# Patient Record
Sex: FEMALE | Race: ASIAN | NOT HISPANIC OR LATINO | Employment: FULL TIME | URBAN - METROPOLITAN AREA
[De-identification: names, ages, dates, MRNs, and addresses within clinical notes are randomized per-mention and may not be internally consistent; named-entity substitution may affect disease eponyms.]

---

## 2019-03-27 ENCOUNTER — HOSPITAL ENCOUNTER (OUTPATIENT)
Dept: URGENT CARE | Facility: CLINIC | Age: 49
Discharge: HOME | End: 2019-03-27

## 2019-03-30 LAB — BACTERIA UR CULT: NORMAL

## 2019-05-13 ENCOUNTER — CONVERSION ENCOUNTER (OUTPATIENT)
Dept: FAMILY MEDICINE CLINIC | Facility: CLINIC | Age: 49
End: 2019-05-13

## 2019-05-13 ENCOUNTER — OFFICE VISIT CONVERTED (OUTPATIENT)
Dept: FAMILY MEDICINE CLINIC | Facility: CLINIC | Age: 49
End: 2019-05-13
Attending: NURSE PRACTITIONER

## 2019-05-13 ENCOUNTER — HOSPITAL ENCOUNTER (OUTPATIENT)
Dept: FAMILY MEDICINE CLINIC | Facility: CLINIC | Age: 49
Discharge: HOME | End: 2019-05-13
Attending: NURSE PRACTITIONER

## 2019-05-13 LAB
APPEARANCE UR: CLEAR
BILIRUB UR QL: NEGATIVE
COLOR UR: YELLOW
CONV COLLECTION SOURCE (UA): NORMAL
CONV UROBILINOGEN IN URINE BY AUTOMATED TEST STRIP: 1 {EHRLICHU}/DL (ref 0.1–1)
EST. AVERAGE GLUCOSE BLD GHB EST-MCNC: 105 MG/DL
GLUCOSE UR QL: NEGATIVE MG/DL
HBA1C MFR BLD: 5.3 % (ref 3.5–5.7)
HGB UR QL STRIP: NEGATIVE
KETONES UR QL STRIP: NEGATIVE MG/DL
LEUKOCYTE ESTERASE UR QL STRIP: NEGATIVE
NITRITE UR QL STRIP: NEGATIVE
PH UR STRIP.AUTO: 7 [PH] (ref 5–8)
PROT UR QL: NEGATIVE MG/DL
SP GR UR: 1.02 (ref 1–1.03)

## 2019-05-15 LAB — BACTERIA UR CULT: NORMAL

## 2020-01-14 ENCOUNTER — OFFICE VISIT CONVERTED (OUTPATIENT)
Dept: FAMILY MEDICINE CLINIC | Facility: CLINIC | Age: 50
End: 2020-01-14
Attending: NURSE PRACTITIONER

## 2020-01-14 ENCOUNTER — HOSPITAL ENCOUNTER (OUTPATIENT)
Dept: FAMILY MEDICINE CLINIC | Facility: CLINIC | Age: 50
Discharge: HOME | End: 2020-01-14
Attending: NURSE PRACTITIONER

## 2020-01-14 LAB
ALBUMIN SERPL-MCNC: 4.6 G/DL (ref 3.5–5)
ALBUMIN/GLOB SERPL: 1.2 {RATIO} (ref 1.4–2.6)
ALP SERPL-CCNC: 66 U/L (ref 42–98)
ALT SERPL-CCNC: 27 U/L (ref 10–40)
ANION GAP SERPL CALC-SCNC: 15 MMOL/L (ref 8–19)
AST SERPL-CCNC: 27 U/L (ref 15–50)
BASOPHILS # BLD AUTO: 0.04 10*3/UL (ref 0–0.2)
BASOPHILS NFR BLD AUTO: 0.6 % (ref 0–3)
BILIRUB SERPL-MCNC: 0.27 MG/DL (ref 0.2–1.3)
BUN SERPL-MCNC: 11 MG/DL (ref 5–25)
BUN/CREAT SERPL: 19 {RATIO} (ref 6–20)
CALCIUM SERPL-MCNC: 9.6 MG/DL (ref 8.7–10.4)
CHLORIDE SERPL-SCNC: 100 MMOL/L (ref 99–111)
CONV ABS IMM GRAN: 0.02 10*3/UL (ref 0–0.2)
CONV CO2: 26 MMOL/L (ref 22–32)
CONV IMMATURE GRAN: 0.3 % (ref 0–1.8)
CONV TOTAL PROTEIN: 8.3 G/DL (ref 6.3–8.2)
CREAT UR-MCNC: 0.57 MG/DL (ref 0.5–0.9)
DEPRECATED RDW RBC AUTO: 44.5 FL (ref 36.4–46.3)
EOSINOPHIL # BLD AUTO: 0.11 10*3/UL (ref 0–0.7)
EOSINOPHIL # BLD AUTO: 1.6 % (ref 0–7)
ERYTHROCYTE [DISTWIDTH] IN BLOOD BY AUTOMATED COUNT: 12.3 % (ref 11.7–14.4)
GFR SERPLBLD BASED ON 1.73 SQ M-ARVRAT: >60 ML/MIN/{1.73_M2}
GLOBULIN UR ELPH-MCNC: 3.7 G/DL (ref 2–3.5)
GLUCOSE SERPL-MCNC: 87 MG/DL (ref 65–99)
HCT VFR BLD AUTO: 43.9 % (ref 37–47)
HGB BLD-MCNC: 13.5 G/DL (ref 12–16)
LYMPHOCYTES # BLD AUTO: 1.52 10*3/UL (ref 1–5)
LYMPHOCYTES NFR BLD AUTO: 22 % (ref 20–45)
MCH RBC QN AUTO: 30 PG (ref 27–31)
MCHC RBC AUTO-ENTMCNC: 30.8 G/DL (ref 33–37)
MCV RBC AUTO: 97.6 FL (ref 81–99)
MONOCYTES # BLD AUTO: 0.33 10*3/UL (ref 0.2–1.2)
MONOCYTES NFR BLD AUTO: 4.8 % (ref 3–10)
NEUTROPHILS # BLD AUTO: 4.89 10*3/UL (ref 2–8)
NEUTROPHILS NFR BLD AUTO: 70.7 % (ref 30–85)
NRBC CBCN: 0 % (ref 0–0.7)
OSMOLALITY SERPL CALC.SUM OF ELEC: 283 MOSM/KG (ref 273–304)
PLATELET # BLD AUTO: 326 10*3/UL (ref 130–400)
PMV BLD AUTO: 10.7 FL (ref 9.4–12.3)
POTASSIUM SERPL-SCNC: 4 MMOL/L (ref 3.5–5.3)
RBC # BLD AUTO: 4.5 10*6/UL (ref 4.2–5.4)
SODIUM SERPL-SCNC: 137 MMOL/L (ref 135–147)
TSH SERPL-ACNC: 2.61 M[IU]/L (ref 0.27–4.2)
VIT B12 SERPL-MCNC: 505 PG/ML (ref 211–911)
WBC # BLD AUTO: 6.91 10*3/UL (ref 4.8–10.8)

## 2020-01-15 ENCOUNTER — HOSPITAL ENCOUNTER (OUTPATIENT)
Dept: MAMMOGRAPHY | Facility: HOSPITAL | Age: 50
Discharge: HOME | End: 2020-01-15
Attending: NURSE PRACTITIONER

## 2020-01-16 LAB — H PYLORI IGM SER-ACNC: <9 UNITS (ref 0–8.9)

## 2020-02-17 ENCOUNTER — OFFICE VISIT CONVERTED (OUTPATIENT)
Dept: FAMILY MEDICINE CLINIC | Facility: CLINIC | Age: 50
End: 2020-02-17
Attending: NURSE PRACTITIONER

## 2020-02-18 ENCOUNTER — HOSPITAL ENCOUNTER (OUTPATIENT)
Dept: FAMILY MEDICINE CLINIC | Facility: CLINIC | Age: 50
Discharge: HOME | End: 2020-02-18
Attending: NURSE PRACTITIONER

## 2020-02-20 LAB
CONV LAST MENSTURAL PERIOD: NORMAL
SPECIMEN SOURCE: NORMAL
SPECIMEN SOURCE: NORMAL
THIN PREP CVX: NORMAL

## 2020-09-03 ENCOUNTER — HOSPITAL ENCOUNTER (OUTPATIENT)
Dept: PREADMISSION TESTING | Facility: HOSPITAL | Age: 50
Discharge: HOME | End: 2020-09-03
Attending: INTERNAL MEDICINE

## 2020-09-04 LAB — SARS-COV-2 RNA SPEC QL NAA+PROBE: NOT DETECTED

## 2020-09-08 ENCOUNTER — HOSPITAL ENCOUNTER (OUTPATIENT)
Dept: GASTROENTEROLOGY | Facility: HOSPITAL | Age: 50
Setting detail: HOSPITAL OUTPATIENT SURGERY
Discharge: HOME | End: 2020-09-08
Attending: INTERNAL MEDICINE

## 2020-09-08 LAB — HCG UR QL: NEGATIVE

## 2020-12-10 ENCOUNTER — OFFICE VISIT CONVERTED (OUTPATIENT)
Dept: GASTROENTEROLOGY | Facility: CLINIC | Age: 50
End: 2020-12-10
Attending: NURSE PRACTITIONER

## 2021-03-10 ENCOUNTER — HOSPITAL ENCOUNTER (OUTPATIENT)
Dept: LAB | Facility: HOSPITAL | Age: 51
Discharge: HOME | End: 2021-03-10
Attending: NURSE PRACTITIONER

## 2021-03-10 ENCOUNTER — OFFICE VISIT CONVERTED (OUTPATIENT)
Dept: GASTROENTEROLOGY | Facility: CLINIC | Age: 51
End: 2021-03-10
Attending: NURSE PRACTITIONER

## 2021-03-11 LAB — H PYLORI IGM SER-ACNC: <9 UNITS (ref 0–8.9)

## 2021-03-12 LAB
CONV HEPATITIS B SURFACE AG W CONFIRMATION RE: NEGATIVE
HBV CORE AB SER DONR QL IA: NEGATIVE
HBV CORE IGM SERPL QL IA: NEGATIVE
HBV E AB SERPL QL IA: NEGATIVE
HBV E AG SERPL QL IA: NEGATIVE
HBV SURFACE AB SER QL: NON REACTIVE

## 2021-05-10 NOTE — H&P
"   History and Physical      Patient Name: Marce Moses   Patient ID: [id number]   Sex: Female   YOB: 1970    Referring Provider: Duyen MCDANIEL    Visit Date: December 10, 2020    Provider: JONAS Boyce   [facility] [facility]   Location Address: [address] [place] [address], [address]  [place], [place]  [id number]   Location Phone: [phone]          Chief Complaint  · GERD      History Of Present Illness  The patient is a 50 year old  female who presents on referral from Duyen MCDANIEL for a gastroenterology evaluation.      Patient reports constant epigastric pain that is sharp at times. Pain worse in the AM and better at night when laying down. Heartburn present with a \"bubbling sensation\". Not currently on a PPI.  Denies any NSAID usage, dysphagia, nausea, or vomiting. Appetite and weight stable.     EGD 9/8/2020: Normal mucosa of the whole esophagus and duodenum.  Erythema in antrum.  3 to 5 mm polyps in the fundus and stomach body.  Colonoscopy 9/8/2020: 3 to 5 mm polyps in transverse colon.  Transverse colon polyp biopsy-fragments of tubular adenoma.  Antrum biopsy-mild chronic active gastritis.  Stomach polyp biopsy-fundic gland polyp.       Past Medical History  Allergy; GERD (gastroesophageal reflux disease); Hepatitis A; History of Helicobacter pylori infection; Migraine; Ovarian cyst; TMJ syndrome; Upset stomach; Vertigo         Past Surgical History  TMJ surgery         Allergy List  NO KNOWN DRUG ALLERGIES       Allergies Reconciled  Family Medical History  Family history of colon cancer; Family history of stroke; Family history of heart disease; Family history of uterine cancer         Social History  Tobacco (Never)         Immunizations  Name Date Admin   Influenza 10/01/2019         Review of Systems  · Constitutional  o Denies  o : chills, fever  · Eyes  o Denies  o : blurred vision, changes in vision  · Cardiovascular  o Denies  o : chest " "pain, syncope  · Respiratory  o Denies  o : shortness of breath, dry cough  · Gastrointestinal  o Admits  o : See HPI  · Genitourinary  o Denies  o : dysuria, blood in urine  · Integument  o Denies  o : rash, skin dryness  · Neurologic  o Denies  o : altered mental status, tingling or numbness  · Musculoskeletal  o Denies  o : joint pain, limitation of motion  · Endocrine  o Denies  o : weight gain, weight loss  · Psychiatric  o Denies  o : anxiety, depression      Vitals  Date Time BP Position Site L\R Cuff Size HR RR TEMP (F) WT  HT  BMI kg/m2 BSA m2 O2 Sat FR L/min FiO2 HC       12/10/2020 02:30 /55 Sitting    67 - R  98.4 137lbs 6oz 5'  2\" 25.13 1.65             Physical Examination  · Constitutional  o Appearance  o : well developed, well-nourished, in no acute distress  · Eyes  o Vision  o :   § Visual Fields  § : eyes move symmetrical in all directions  o Sclerae  o : anicteric  o Pupils and Irises  o : pupils equal and symmetrical  · Neck  o Inspection/Palpation  o : supple  · Respiratory  o Respiratory Effort  o : breathing unlabored  o Inspection of Chest  o : normal appearance, no retractions  o Auscultation of Lungs  o : clear to auscultation bilaterally  · Cardiovascular  o Heart  o :   § Auscultation of Heart  § : no murmurs, gallops or rubs  · Gastrointestinal  o Abdominal Examination  o : epigastric region tenderness to palpation present, normal bowel sounds, tone normal without rigidity or guarding, no masses present, abdomen scaphoid upon supine  o Digital Rectal Exam  o : deferred  · Lymphatic  o Neck  o : no palpable lymphadenopathy  · Skin and Subcutaneous Tissue  o General Inspection  o : without focal lesions; turgor is normal  · Psychiatric  o General  o : Alert and oriented x3  o Mood and Affect  o : Mood and affect are appropriate to circumstances          Assessment  · Epigastric pain     789.06/R10.13  · Colon polyp     211.3/K63.5  · Gastritis     535.50/K29.70  · Fundic gland " polyps of stomach, benign     211.1/D13.1      Plan  · Medications  o Protonix 20 mg oral tablet,delayed release (DR/EC)   SIG: take 1 tablet (20 mg) by oral route once daily for 90 days   DISP: (90) Tablet with 0 refills  Prescribed on 12/10/2020     o Pepcid 20 mg oral tablet   SIG: take 1 tablet (20 mg) by oral route once daily at bedtime for 90 days   DISP: (90) Tablet with 0 refills  Prescribed on 12/10/2020     o Medications have been Reconciled  · Instructions  o Information given on current diagnoses.  o Lifestyle modifications discussed.  o Discussed risks of long-term PPI use.  o Electronically Identified Patient Education Materials Provided Electronically  · Disposition  o 3 month f/u            Electronically Signed by: JONAS Boyce -Author on December 10, 2020 02:50:29 PM

## 2021-05-14 VITALS
HEART RATE: 74 BPM | BODY MASS INDEX: 24.7 KG/M2 | TEMPERATURE: 97.3 F | SYSTOLIC BLOOD PRESSURE: 119 MMHG | WEIGHT: 134.25 LBS | DIASTOLIC BLOOD PRESSURE: 68 MMHG | HEIGHT: 62 IN

## 2021-05-14 VITALS
BODY MASS INDEX: 25.28 KG/M2 | HEART RATE: 67 BPM | SYSTOLIC BLOOD PRESSURE: 129 MMHG | TEMPERATURE: 98.4 F | WEIGHT: 137.37 LBS | DIASTOLIC BLOOD PRESSURE: 55 MMHG | HEIGHT: 62 IN

## 2021-05-14 NOTE — PROGRESS NOTES
"   Progress Note      Patient Name: Marce Moses   Patient ID: [id number]   Sex: Female   YOB: 1970    Referring Provider: Duyen MCDANIEL    Visit Date: March 10, 2021    Provider: JONAS Boyce   Location: [facility]   Location Address: [address] [place] [address], [address]  [place], [place]  [id number]   Location Phone: [phone]          Chief Complaint  · Follow up GERD      History Of Present Illness     Ms. Moses presents today for f/u GERD. Reports she is feeling much better she is no longer having any epigastric pain or reflux.  Has not had PPI in the last 2 months and has done well. Denies any abdominal pain, nausea, vomiting, change in appetite or weight stable.     Wanting a hepatitis panel as she states she had hepatitis A in 1996. Also wanting a H pylori lab as she had it 6 years ago. \"Wanting to make sure my health is good\".     EGD 9/8/2020: Normal mucosa of the whole esophagus and duodenum.  Erythema in antrum.  3 to 5 mm polyps in the fundus and stomach body.  Colonoscopy 9/8/2020: 3 to 5 mm polyps in transverse colon.  Transverse colon polyp biopsy-fragments of tubular adenoma.  Antrum biopsy-mild chronic active gastritis.  Stomach polyp biopsy-fundic gland polyp.            Past Medical History  Allergy; GERD (gastroesophageal reflux disease); Hepatitis A; History of Helicobacter pylori infection; Migraine; Ovarian cyst; TMJ syndrome; Upset stomach; Vertigo         Past Surgical History  TMJ surgery         Allergy List  NO KNOWN DRUG ALLERGIES       Allergies Reconciled  Family Medical History  Family history of colon cancer; Family history of stroke; Family history of heart disease; Family history of uterine cancer         Social History  Tobacco (Never)         Immunizations  Name Date Admin   Influenza 10/01/2019         Review of Systems  · Constitutional  o Denies  o : chills, fever  · Cardiovascular  o Denies  o : chest pain, dyspnea on " "exertion  · Respiratory  o Denies  o : cough, shortness of breath  · Gastrointestinal  o Admits  o : see HPI   · Endocrine  o Denies  o : weight gain, weight loss      Vitals  Date Time BP Position Site L\R Cuff Size HR RR TEMP (F) WT  HT  BMI kg/m2 BSA m2 O2 Sat FR L/min FiO2 HC       03/10/2021 02:21 /68 Sitting    74 - R  97.3 134lbs 4oz 5'  2\" 24.55 1.63             Physical Examination  · Constitutional  o Appearance  o : Healthy-appearing, awake and alert in no acute distress  · Head and Face  o Head  o : Normocephalic with no worriesome skin lesions  · Eyes  o Vision  o :   § Visual Fields  § : eyes move symmetrical in all directions  o Sclerae  o : sclerae anicteric  o Pupils and Irises  o : pupils equal and symmetrical  · Neck  o Inspection/Palpation  o : Trachea is midline, no adenopathy  · Respiratory  o Respiratory Effort  o : Breathing is unlabored.  o Inspection of Chest  o : normal appearance  o Auscultation of Lungs  o : Chest is clear to auscultation bilaterally.  · Cardiovascular  o Heart  o :   § Auscultation of Heart  § : no murmurs, rubs, or gallops  o Peripheral Vascular System  o :   § Extremities  § : no cyanosis, clubbing or edema;   · Gastrointestinal  o Abdominal Examination  o : Abdomen is soft, nontender to palpation, with normal active bowel sounds, no appreciable hepatosplenomegaly.  o Digital Rectal Exam  o : deferred  · Skin and Subcutaneous Tissue  o General Inspection  o : without focal lesions; turgor is normal  · Psychiatric  o General  o : Alert and oriented x3  o Mood and Affect  o : Mood and affect are appropriate to circumstances          Assessment  · History of hepatitis A     V12.09/Z86.19  · History of Helicobacter pylori infection     V12.09/Z86.19      Plan  · Orders  o Hepatitis panel (HBsAg, HBsAb, HBc IgM, HBe antigen and antibody) (10044, 40666, 34845, 77430, 48925) - - 03/10/2021  o H pylori IgM ab (46601) - - 03/10/2021  · Medications  o Medications have " been Reconciled  · Instructions  o Information given on current diagnoses.  o Lifestyle modifications discussed.  o Electronically Identified Patient Education Materials Provided Electronically  · Disposition  o F/U TBD after results            Electronically Signed by: JONAS Boyce -Author on March 10, 2021 02:39:48 PM

## 2021-05-15 VITALS
TEMPERATURE: 98.5 F | WEIGHT: 136 LBS | DIASTOLIC BLOOD PRESSURE: 70 MMHG | OXYGEN SATURATION: 98 % | SYSTOLIC BLOOD PRESSURE: 108 MMHG | HEART RATE: 77 BPM | BODY MASS INDEX: 25.03 KG/M2 | HEIGHT: 62 IN

## 2021-05-15 VITALS
TEMPERATURE: 98.3 F | WEIGHT: 140.37 LBS | SYSTOLIC BLOOD PRESSURE: 106 MMHG | OXYGEN SATURATION: 96 % | BODY MASS INDEX: 25.83 KG/M2 | DIASTOLIC BLOOD PRESSURE: 66 MMHG | HEIGHT: 62 IN | HEART RATE: 64 BPM

## 2021-05-15 VITALS
OXYGEN SATURATION: 97 % | SYSTOLIC BLOOD PRESSURE: 138 MMHG | DIASTOLIC BLOOD PRESSURE: 84 MMHG | WEIGHT: 134.5 LBS | HEART RATE: 76 BPM | TEMPERATURE: 98.3 F | BODY MASS INDEX: 24.75 KG/M2 | HEIGHT: 62 IN

## 2021-07-28 ENCOUNTER — OFFICE VISIT (OUTPATIENT)
Dept: FAMILY MEDICINE CLINIC | Facility: CLINIC | Age: 51
End: 2021-07-28

## 2021-07-28 VITALS
SYSTOLIC BLOOD PRESSURE: 104 MMHG | BODY MASS INDEX: 24.44 KG/M2 | WEIGHT: 132.8 LBS | DIASTOLIC BLOOD PRESSURE: 72 MMHG | HEIGHT: 62 IN | HEART RATE: 70 BPM | OXYGEN SATURATION: 98 % | TEMPERATURE: 98.2 F

## 2021-07-28 DIAGNOSIS — Z12.31 ENCOUNTER FOR SCREENING MAMMOGRAM FOR MALIGNANT NEOPLASM OF BREAST: ICD-10-CM

## 2021-07-28 DIAGNOSIS — Z00.00 ENCOUNTER FOR ANNUAL PHYSICAL EXAM: ICD-10-CM

## 2021-07-28 DIAGNOSIS — R92.8 ABNORMAL MAMMOGRAM OF RIGHT BREAST: ICD-10-CM

## 2021-07-28 DIAGNOSIS — Z12.4 ENCOUNTER FOR PAPANICOLAOU SMEAR FOR CERVICAL CANCER SCREENING: Primary | ICD-10-CM

## 2021-07-28 PROBLEM — G43.909 MIGRAINE: Status: ACTIVE | Noted: 2021-07-28

## 2021-07-28 PROBLEM — B15.9 HEPATITIS A: Status: ACTIVE | Noted: 2021-07-28

## 2021-07-28 PROBLEM — K21.9 GERD (GASTROESOPHAGEAL REFLUX DISEASE): Status: ACTIVE | Noted: 2020-01-14

## 2021-07-28 PROBLEM — M26.629 TMJ SYNDROME: Status: ACTIVE | Noted: 2021-07-28

## 2021-07-28 PROBLEM — A04.8 HELICOBACTER PYLORI GASTROINTESTINAL TRACT INFECTION: Status: ACTIVE | Noted: 2020-01-14

## 2021-07-28 PROBLEM — N83.209 OVARIAN CYST: Status: ACTIVE | Noted: 2021-07-28

## 2021-07-28 LAB
ALBUMIN SERPL-MCNC: 4.1 G/DL (ref 3.5–5.2)
ALBUMIN/GLOB SERPL: 1.3 G/DL
ALP SERPL-CCNC: 50 U/L (ref 39–117)
ALT SERPL W P-5'-P-CCNC: 14 U/L (ref 1–33)
ANION GAP SERPL CALCULATED.3IONS-SCNC: 7 MMOL/L (ref 5–15)
AST SERPL-CCNC: 20 U/L (ref 1–32)
BASOPHILS # BLD AUTO: 0.02 10*3/MM3 (ref 0–0.2)
BASOPHILS NFR BLD AUTO: 0.3 % (ref 0–1.5)
BILIRUB SERPL-MCNC: 0.4 MG/DL (ref 0–1.2)
BUN SERPL-MCNC: 12 MG/DL (ref 6–20)
BUN/CREAT SERPL: 21.1 (ref 7–25)
CALCIUM SPEC-SCNC: 8.8 MG/DL (ref 8.6–10.5)
CHLORIDE SERPL-SCNC: 104 MMOL/L (ref 98–107)
CHOLEST SERPL-MCNC: 213 MG/DL (ref 0–200)
CO2 SERPL-SCNC: 26 MMOL/L (ref 22–29)
CREAT SERPL-MCNC: 0.57 MG/DL (ref 0.57–1)
DEPRECATED RDW RBC AUTO: 40.7 FL (ref 37–54)
EOSINOPHIL # BLD AUTO: 0.08 10*3/MM3 (ref 0–0.4)
EOSINOPHIL NFR BLD AUTO: 1.3 % (ref 0.3–6.2)
ERYTHROCYTE [DISTWIDTH] IN BLOOD BY AUTOMATED COUNT: 11.8 % (ref 12.3–15.4)
GFR SERPL CREATININE-BSD FRML MDRD: 112 ML/MIN/1.73
GFR SERPL CREATININE-BSD FRML MDRD: 136 ML/MIN/1.73
GLOBULIN UR ELPH-MCNC: 3.2 GM/DL
GLUCOSE SERPL-MCNC: 85 MG/DL (ref 65–99)
HCT VFR BLD AUTO: 40 % (ref 34–46.6)
HDLC SERPL-MCNC: 64 MG/DL (ref 40–60)
HGB BLD-MCNC: 13.2 G/DL (ref 12–15.9)
IMM GRANULOCYTES # BLD AUTO: 0.01 10*3/MM3 (ref 0–0.05)
IMM GRANULOCYTES NFR BLD AUTO: 0.2 % (ref 0–0.5)
LDLC SERPL CALC-MCNC: 136 MG/DL (ref 0–100)
LDLC/HDLC SERPL: 2.11 {RATIO}
LYMPHOCYTES # BLD AUTO: 1.27 10*3/MM3 (ref 0.7–3.1)
LYMPHOCYTES NFR BLD AUTO: 20.9 % (ref 19.6–45.3)
MCH RBC QN AUTO: 31 PG (ref 26.6–33)
MCHC RBC AUTO-ENTMCNC: 33 G/DL (ref 31.5–35.7)
MCV RBC AUTO: 93.9 FL (ref 79–97)
MONOCYTES # BLD AUTO: 0.39 10*3/MM3 (ref 0.1–0.9)
MONOCYTES NFR BLD AUTO: 6.4 % (ref 5–12)
NEUTROPHILS NFR BLD AUTO: 4.32 10*3/MM3 (ref 1.7–7)
NEUTROPHILS NFR BLD AUTO: 70.9 % (ref 42.7–76)
NRBC BLD AUTO-RTO: 0 /100 WBC (ref 0–0.2)
PLATELET # BLD AUTO: 280 10*3/MM3 (ref 140–450)
PMV BLD AUTO: 10.5 FL (ref 6–12)
POTASSIUM SERPL-SCNC: 4.1 MMOL/L (ref 3.5–5.2)
PROT SERPL-MCNC: 7.3 G/DL (ref 6–8.5)
RBC # BLD AUTO: 4.26 10*6/MM3 (ref 3.77–5.28)
SODIUM SERPL-SCNC: 137 MMOL/L (ref 136–145)
TRIGL SERPL-MCNC: 71 MG/DL (ref 0–150)
TSH SERPL DL<=0.05 MIU/L-ACNC: 2.45 UIU/ML (ref 0.27–4.2)
VLDLC SERPL-MCNC: 13 MG/DL (ref 5–40)
WBC # BLD AUTO: 6.09 10*3/MM3 (ref 3.4–10.8)

## 2021-07-28 PROCEDURE — G0148 SCR C/V CYTO, AUTOSYS, RESCR: HCPCS | Performed by: NURSE PRACTITIONER

## 2021-07-28 PROCEDURE — 80053 COMPREHEN METABOLIC PANEL: CPT | Performed by: NURSE PRACTITIONER

## 2021-07-28 PROCEDURE — 85025 COMPLETE CBC W/AUTO DIFF WBC: CPT | Performed by: NURSE PRACTITIONER

## 2021-07-28 PROCEDURE — 99396 PREV VISIT EST AGE 40-64: CPT | Performed by: NURSE PRACTITIONER

## 2021-07-28 PROCEDURE — 80061 LIPID PANEL: CPT | Performed by: NURSE PRACTITIONER

## 2021-07-28 PROCEDURE — 84443 ASSAY THYROID STIM HORMONE: CPT | Performed by: NURSE PRACTITIONER

## 2021-07-28 RX ORDER — DIPHENOXYLATE HYDROCHLORIDE AND ATROPINE SULFATE 2.5; .025 MG/1; MG/1
1 TABLET ORAL DAILY
Qty: 90 TABLET | Refills: 3 | Status: SHIPPED | OUTPATIENT
Start: 2021-07-28 | End: 2022-04-04

## 2021-07-28 NOTE — PROGRESS NOTES
"0Chief Complaint  Gynecologic Exam    Subjective          Marce Moses presents to [facility]  History of Present Illness  SUBJECTIVE:   51 y.o. female for annual routine Pap and checkup.  She would like to have a lab work-up done today.  She states that she eats a lot of junk food.  She would like prescriptions for multivitamin.  She is not currently taking calcium.  She is not currently taking any medications.    Allergies: Patient has no known allergies.   LMP 6/25/2021.    ROS:  Feeling well. No dyspnea or chest pain on exertion.  No abdominal pain, change in bowel habits, black or bloody stools.  No urinary tract symptoms. GYN ROS: no breast pain or new or enlarging lumps on self exam, she complains of abnormal menses, had 2 cycles last month, none this month.  She complains of some hot flashes but denies being too bothersome. No neurological complaints.    OBJECTIVE:   The patient appears well, alert, oriented x 3, in no distress.  /72   Pulse 70   Temp 98.2 °F (36.8 °C)   Ht 157.5 cm (62\")   Wt 60.2 kg (132 lb 12.8 oz)   SpO2 98%   BMI 24.29 kg/m²   ENT normal.  Neck supple. No adenopathy or thyromegaly. NEENA. Lungs are clear, good air entry, no wheezes, rhonchi or rales. S1 and S2 normal, no murmurs, regular rate and rhythm. Abdomen soft without tenderness, guarding, mass or organomegaly. Extremities show no edema, normal peripheral pulses. Neurological is normal, no focal findings.    BREAST EXAM: breasts appear normal, no suspicious masses, no skin or nipple changes or axillary nodes    PELVIC EXAM: normal external genitalia, vulva, vagina, cervix, uterus and adnexa, CERVIX: normal appearing cervix without discharge or lesions    ASSESSMENT:   well woman    PLAN:   mammogram  pap smear  return annually or prn  Objective   Vital Signs:   /72   Pulse 70   Temp 98.2 °F (36.8 °C)   Ht 157.5 cm (62\")   Wt 60.2 kg (132 lb 12.8 oz)   SpO2 98%   BMI 24.29 " kg/m²     Physical Exam  Vitals reviewed.   Constitutional:       General: She is not in acute distress.     Appearance: Normal appearance. She is well-developed. She is not diaphoretic.   HENT:      Head: Normocephalic and atraumatic. Hair is normal.   Neck:      Thyroid: No thyromegaly.      Vascular: No JVD.   Cardiovascular:      Rate and Rhythm: Normal rate and regular rhythm.      Pulses: Normal pulses.      Heart sounds: Normal heart sounds. No murmur heard.   No friction rub. No gallop.    Pulmonary:      Effort: Pulmonary effort is normal. No respiratory distress.      Breath sounds: Normal breath sounds. No wheezing or rales.   Chest:      Chest wall: No tenderness.      Breasts:         Right: Normal. No swelling, mass, skin change or tenderness.         Left: Normal. No swelling, mass, skin change or tenderness.   Abdominal:      Palpations: Abdomen is soft.   Genitourinary:     General: Normal vulva.      Vagina: Normal. No vaginal discharge, erythema, bleeding, lesions or prolapsed vaginal walls.      Cervix: No cervical motion tenderness, discharge, friability, lesion, erythema or cervical bleeding.      Uterus: Normal. Not deviated, not enlarged, not fixed, not tender and no uterine prolapse.       Adnexa: Right adnexa normal and left adnexa normal.        Right: No mass, tenderness or fullness.          Left: No mass, tenderness or fullness.     Musculoskeletal:         General: No tenderness or deformity. Normal range of motion.      Cervical back: Normal range of motion and neck supple.   Lymphadenopathy:      Cervical: No cervical adenopathy.   Skin:     General: Skin is warm and dry.      Findings: No erythema or rash.   Neurological:      Mental Status: She is alert and oriented to person, place, and time.      Cranial Nerves: No cranial nerve deficit.      Motor: No abnormal muscle tone.      Coordination: Coordination normal.      Deep Tendon Reflexes: Reflexes are normal and symmetric.  Reflexes normal.   Psychiatric:         Mood and Affect: Mood normal.         Behavior: Behavior normal.         Thought Content: Thought content normal.         Judgment: Judgment normal.        Result Review :                 Assessment and Plan    Diagnoses and all orders for this visit:    1. Encounter for Papanicolaou smear for cervical cancer screening (Primary)  -     IGP, Rfx Aptima HPV ASCU    2. Encounter for screening mammogram for malignant neoplasm of breast  -     Mammo Screening Digital Tomosynthesis Bilateral With CAD; Future    3. Encounter for annual physical exam  Comments:  Discussed need to do self breast exams monthly.  Discussed these to take calcium.  Advised on preventative measures, see AVS.  Orders:  -     CBC & Differential  -     Comprehensive Metabolic Panel  -     Lipid Panel  -     TSH Rfx On Abnormal To Free T4    Other orders  -     Calcium Carb-Cholecalciferol (Calcium 500 + D) 500-200 MG-UNIT tablet; Take 1 tablet by mouth 2 (two) times a day.  Dispense: 180 tablet; Refill: 3  -     multivitamin (multivitamin) tablet tablet; Take 1 tablet by mouth Daily.  Dispense: 90 tablet; Refill: 3        Follow Up   Return in about 1 year (around 7/28/2022) for Annual physical.  Patient was given instructions and counseling regarding her condition or for health maintenance advice. Please see specific information pulled into the AVS if appropriate.

## 2021-07-28 NOTE — PATIENT INSTRUCTIONS
Preventive Care 40-64 Years Old, Female  Preventive care refers to visits with your health care provider and lifestyle choices that can promote health and wellness. This includes:  · A yearly physical exam. This may also be called an annual well check.  · Regular dental visits and eye exams.  · Immunizations.  · Screening for certain conditions.  · Healthy lifestyle choices, such as eating a healthy diet, getting regular exercise, not using drugs or products that contain nicotine and tobacco, and limiting alcohol use.  What can I expect for my preventive care visit?  Physical exam  Your health care provider will check your:  · Height and weight. This may be used to calculate body mass index (BMI), which tells if you are at a healthy weight.  · Heart rate and blood pressure.  · Skin for abnormal spots.  Counseling  Your health care provider may ask you questions about your:  · Alcohol, tobacco, and drug use.  · Emotional well-being.  · Home and relationship well-being.  · Sexual activity.  · Eating habits.  · Work and work environment.  · Method of birth control.  · Menstrual cycle.  · Pregnancy history.  What immunizations do I need?    Influenza (flu) vaccine  · This is recommended every year.  Tetanus, diphtheria, and pertussis (Tdap) vaccine  · You may need a Td booster every 10 years.  Varicella (chickenpox) vaccine  · You may need this if you have not been vaccinated.  Zoster (shingles) vaccine  · You may need this after age 60.  Measles, mumps, and rubella (MMR) vaccine  · You may need at least one dose of MMR if you were born in 1957 or later. You may also need a second dose.  Pneumococcal conjugate (PCV13) vaccine  · You may need this if you have certain conditions and were not previously vaccinated.  Pneumococcal polysaccharide (PPSV23) vaccine  · You may need one or two doses if you smoke cigarettes or if you have certain conditions.  Meningococcal conjugate (MenACWY) vaccine  · You may need this if you  have certain conditions.  Hepatitis A vaccine  · You may need this if you have certain conditions or if you travel or work in places where you may be exposed to hepatitis A.  Hepatitis B vaccine  · You may need this if you have certain conditions or if you travel or work in places where you may be exposed to hepatitis B.  Haemophilus influenzae type b (Hib) vaccine  · You may need this if you have certain conditions.  Human papillomavirus (HPV) vaccine  · If recommended by your health care provider, you may need three doses over 6 months.  You may receive vaccines as individual doses or as more than one vaccine together in one shot (combination vaccines). Talk with your health care provider about the risks and benefits of combination vaccines.  What tests do I need?  Blood tests  · Lipid and cholesterol levels. These may be checked every 5 years, or more frequently if you are over 50 years old.  · Hepatitis C test.  · Hepatitis B test.  Screening  · Lung cancer screening. You may have this screening every year starting at age 55 if you have a 30-pack-year history of smoking and currently smoke or have quit within the past 15 years.  · Colorectal cancer screening. All adults should have this screening starting at age 50 and continuing until age 75. Your health care provider may recommend screening at age 45 if you are at increased risk. You will have tests every 1-10 years, depending on your results and the type of screening test.  · Diabetes screening. This is done by checking your blood sugar (glucose) after you have not eaten for a while (fasting). You may have this done every 1-3 years.  · Mammogram. This may be done every 1-2 years. Talk with your health care provider about when you should start having regular mammograms. This may depend on whether you have a family history of breast cancer.  · BRCA-related cancer screening. This may be done if you have a family history of breast, ovarian, tubal, or peritoneal  cancers.  · Pelvic exam and Pap test. This may be done every 3 years starting at age 21. Starting at age 30, this may be done every 5 years if you have a Pap test in combination with an HPV test.  Other tests  · Sexually transmitted disease (STD) testing.  · Bone density scan. This is done to screen for osteoporosis. You may have this scan if you are at high risk for osteoporosis.  Follow these instructions at home:  Eating and drinking  · Eat a diet that includes fresh fruits and vegetables, whole grains, lean protein, and low-fat dairy.  · Take vitamin and mineral supplements as recommended by your health care provider.  · Do not drink alcohol if:  ? Your health care provider tells you not to drink.  ? You are pregnant, may be pregnant, or are planning to become pregnant.  · If you drink alcohol:  ? Limit how much you have to 0-1 drink a day.  ? Be aware of how much alcohol is in your drink. In the [place] one drink equals one 12 oz bottle of beer (355 mL), one 5 oz glass of wine (148 mL), or one 1½ oz glass of hard liquor (44 mL).  Lifestyle  · Take daily care of your teeth and gums.  · Stay active. Exercise for at least 30 minutes on 5 or more days each week.  · Do not use any products that contain nicotine or tobacco, such as cigarettes, e-cigarettes, and chewing tobacco. If you need help quitting, ask your health care provider.  · If you are sexually active, practice safe sex. Use a condom or other form of birth control (contraception) in order to prevent pregnancy and STIs (sexually transmitted infections).  · If told by your health care provider, take low-dose aspirin daily starting at age 50.  What's next?  · Visit your health care provider once a year for a well check visit.  · Ask your health care provider how often you should have your eyes and teeth checked.  · Stay up to date on all vaccines.  This information is not intended to replace advice given to you by your health care provider. Make sure you  discuss any questions you have with your health care provider.  Document Revised: 08/29/2019 Document Reviewed: 08/29/2019  Elsevier Patient Education © 2020 [employer]    Bone Health  Bones protect organs, store calcium, anchor muscles, and support the whole body. Keeping your bones strong is important, especially as you get older. You can take actions to help keep your bones strong and healthy.  Why is keeping my bones healthy important?    Keeping your bones healthy is important because your body constantly replaces bone cells. Cells get old, and new cells take their place. As we age, we lose bone cells because the body may not be able to make enough new cells to replace the old cells. The amount of bone cells and bone tissue you have is referred to as bone mass. The higher your bone mass, the stronger your bones.  The aging process leads to an overall loss of bone mass in the body, which can increase the likelihood of:  · Joint pain and stiffness.  · Broken bones.  · A condition in which the bones become weak and brittle (osteoporosis).  A large decline in bone mass occurs in older adults. In women, it occurs about the time of menopause.  What actions can I take to keep my bones healthy?  Good health habits are important for maintaining healthy bones. This includes eating nutritious foods and exercising regularly. To have healthy bones, you need to get enough of the right minerals and vitamins. Most nutrition experts recommend getting these nutrients from the foods that you eat. In some cases, taking supplements may also be recommended. Doing certain types of exercise is also important for bone health.  What are the nutritional recommendations for healthy bones?    Eating a well-balanced diet with plenty of calcium and vitamin D will help to protect your bones. Nutritional recommendations vary from person to person. Ask your health care provider what is healthy for you. Here are some general guidelines.  Get  enough calcium  Calcium is the most important (essential) mineral for bone health. Most people can get enough calcium from their diet, but supplements may be recommended for people who are at risk for osteoporosis. Good sources of calcium include:  · Dairy products, such as low-fat or nonfat milk, cheese, and yogurt.  · Dark green leafy vegetables, such as bok carolina and broccoli.  · Calcium-fortified foods, such as orange juice, cereal, bread, soy beverages, and tofu products.  · Nuts, such as almonds.  Follow these recommended amounts for daily calcium intake:  · Children, age 1-3: 700 mg.  · Children, age 4-8: 1,000 mg.  · Children, age 9-13: 1,300 mg.  · Teens, age 14-18: 1,300 mg.  · Adults, age 19-50: 1,000 mg.  · Adults, age 51-70:  ? Men: 1,000 mg.  ? Women: 1,200 mg.  · Adults, age 71 or older: 1,200 mg.  · Pregnant and breastfeeding females:  ? Teens: 1,300 mg.  ? Adults: 1,000 mg.  Get enough vitamin D  Vitamin D is the most essential vitamin for bone health. It helps the body absorb calcium. Sunlight stimulates the skin to make vitamin D, so be sure to get enough sunlight. If you live in a cold climate or you do not get outside often, your health care provider may recommend that you take vitamin D supplements. Good sources of vitamin D in your diet include:  · Egg yolks.  · Saltwater fish.  · Milk and cereal fortified with vitamin D.  Follow these recommended amounts for daily vitamin D intake:  · Children and teens, age 1-18: 600 international units.  · Adults, age 50 or younger: 400-800 international units.  · Adults, age 51 or older: 800-1,000 international units.  Get other important nutrients  Other nutrients that are important for bone health include:  · Phosphorus. This mineral is found in meat, poultry, dairy foods, nuts, and legumes. The recommended daily intake for adult men and adult women is 700 mg.  · Magnesium. This mineral is found in seeds, nuts, dark green vegetables, and legumes. The  recommended daily intake for adult men is 400-420 mg. For adult women, it is 310-320 mg.  · Vitamin K. This vitamin is found in green leafy vegetables. The recommended daily intake is 120 mg for adult men and 90 mg for adult women.  What type of physical activity is best for building and maintaining healthy bones?  Weight-bearing and strength-building activities are important for building and maintaining healthy bones. Weight-bearing activities cause muscles and bones to work against gravity. Strength-building activities increase the strength of the muscles that support bones. Weight-bearing and muscle-building activities include:  · Walking and hiking.  · Jogging and running.  · Dancing.  · Gym exercises.  · Lifting weights.  · Tennis and racquetball.  · Climbing stairs.  · Aerobics.  Adults should get at least 30 minutes of moderate physical activity on most days. Children should get at least 60 minutes of moderate physical activity on most days. Ask your health care provider what type of exercise is best for you.  How can I find out if my bone mass is low?  Bone mass can be measured with an X-ray test called a bone mineral density (BMD) test. This test is recommended for all women who are age 65 or older. It may also be recommended for:  · Men who are age 70 or older.  · People who are at risk for osteoporosis because of:  ? Having bones that break easily.  ? Having a long-term disease that weakens bones, such as kidney disease or rheumatoid arthritis.  ? Having menopause earlier than normal.  ? Taking medicine that weakens bones, such as steroids, thyroid hormones, or hormone treatment for breast cancer or prostate cancer.  ? Smoking.  ? Drinking three or more alcoholic drinks a day.  If you find that you have a low bone mass, you may be able to prevent osteoporosis or further bone loss by changing your diet and lifestyle.  Where can I find more information?  For more information, check out the following  websites:  · National Osteoporosis Foundation: www.nof.org/patients  · National Institutes of Health: www.bones.nih.gov  · International Osteoporosis Foundation: www.iofbonehealth.org  Summary  · The aging process leads to an overall loss of bone mass in the body, which can increase the likelihood of broken bones and osteoporosis.  · Eating a well-balanced diet with plenty of calcium and vitamin D will help to protect your bones.  · Weight-bearing and strength-building activities are also important for building and maintaining strong bones.  · Bone mass can be measured with an X-ray test called a bone mineral density (BMD) test.  This information is not intended to replace advice given to you by your health care provider. Make sure you discuss any questions you have with your health care provider.  Document Revised: 01/14/2019 Document Reviewed: 01/14/2019  [employer] Patient Education © 2021 [employer]    Perimenopause    Perimenopause is the normal time of life before and after menstrual periods stop completely (menopause). Perimenopause can begin 2-8 years before menopause, and it usually lasts for 1 year after menopause. During perimenopause, the ovaries may or may not produce an egg.  What are the causes?  This condition is caused by a natural change in hormone levels that happens as you get older.  What increases the risk?  This condition is more likely to start at an earlier age if you have certain medical conditions or treatments, including:  · A tumor of the pituitary gland in the brain.  · A disease that affects the ovaries and hormone production.  · Radiation treatment for cancer.  · Certain cancer treatments, such as chemotherapy or hormone (anti-estrogen) therapy.  · Heavy smoking and excessive alcohol use.  · Family history of early menopause.  What are the signs or symptoms?  Perimenopausal changes affect each woman differently. Symptoms of this condition may include:  · Hot flashes.  · Night  sweats.  · Irregular menstrual periods.  · Decreased sex drive.  · Vaginal dryness.  · Headaches.  · Mood swings.  · Depression.  · Memory problems or trouble concentrating.  · Irritability.  · Tiredness.  · Weight gain.  · Anxiety.  · Trouble getting pregnant.  How is this diagnosed?  This condition is diagnosed based on your medical history, a physical exam, your age, your menstrual history, and your symptoms. Hormone tests may also be done.  How is this treated?  In some cases, no treatment is needed. You and your health care provider should make a decision together about whether treatment is necessary. Treatment will be based on your individual condition and preferences. Various treatments are available, such as:  · Menopausal hormone therapy (MHT).  · Medicines to treat specific symptoms.  · Acupuncture.  · Vitamin or herbal supplements.  Before starting treatment, make sure to let your health care provider know if you have a personal or family history of:  · Heart disease.  · Breast cancer.  · Blood clots.  · Diabetes.  · Osteoporosis.  Follow these instructions at home:  Lifestyle  · Do not use any products that contain nicotine or tobacco, such as cigarettes and e-cigarettes. If you need help quitting, ask your health care provider.  · Eat a balanced diet that includes fresh fruits and vegetables, whole grains, soybeans, eggs, lean meat, and low-fat dairy.  · Get at least 30 minutes of physical activity on 5 or more days each week.  · Avoid alcoholic and caffeinated beverages, as well as spicy foods. This may help prevent hot flashes.  · Get 7-8 hours of sleep each night.  · Dress in layers that can be removed to help you manage hot flashes.  · Find ways to manage stress, such as deep breathing, meditation, or journaling.  General instructions  · Keep track of your menstrual periods, including:  ? When they occur.  ? How heavy they are and how long they last.  ? How much time passes between periods.  · Keep  track of your symptoms, noting when they start, how often you have them, and how long they last.  · Take over-the-counter and prescription medicines only as told by your health care provider.  · Take vitamin supplements only as told by your health care provider. These may include calcium, vitamin E, and vitamin D.  · Use vaginal lubricants or moisturizers to help with vaginal dryness and improve comfort during sex.  · Talk with your health care provider before starting any herbal supplements.  · Keep all follow-up visits as told by your health care provider. This is important. This includes any group therapy or counseling.  Contact a health care provider if:  · You have heavy vaginal bleeding or pass blood clots.  · Your period lasts more than 2 days longer than normal.  · Your periods are recurring sooner than 21 days.  · You bleed after having sex.  Get help right away if:  · You have chest pain, trouble breathing, or trouble talking.  · You have severe depression.  · You have pain when you urinate.  · You have severe headaches.  · You have vision problems.  Summary  · Perimenopause is the time when a woman's body begins to move into menopause. This may happen naturally or as a result of other health problems or medical treatments.  · Perimenopause can begin 2-8 years before menopause, and it usually lasts for 1 year after menopause.  · Perimenopausal symptoms can be managed through medicines, lifestyle changes, and complementary therapies such as acupuncture.  This information is not intended to replace advice given to you by your health care provider. Make sure you discuss any questions you have with your health care provider.  Document Revised: 11/30/2018 Document Reviewed: 01/23/2018  Elsevier Patient Education © 2021 [employer]

## 2021-07-30 ENCOUNTER — TELEPHONE (OUTPATIENT)
Dept: FAMILY MEDICINE CLINIC | Facility: CLINIC | Age: 51
End: 2021-07-30

## 2021-07-30 LAB
CONV .: NORMAL
CYTOLOGIST CVX/VAG CYTO: NORMAL
CYTOLOGY CVX/VAG DOC CYTO: NORMAL
CYTOLOGY CVX/VAG DOC THIN PREP: NORMAL
DX ICD CODE: NORMAL
HIV 1 & 2 AB SER-IMP: NORMAL
OTHER STN SPEC: NORMAL
STAT OF ADQ CVX/VAG CYTO-IMP: NORMAL

## 2021-07-30 NOTE — TELEPHONE ENCOUNTER
Caller: Marce Moses    Relationship: Self    Best call back number: [phone]    Caller requesting test results: SELF    What test was performed: LAB WORK    When was the test performed: 7/28/2021    Where was the test performed: OFFICE    Additional notes: REQUEST PAPER COPY FOR

## 2021-08-05 ENCOUNTER — HOSPITAL ENCOUNTER (OUTPATIENT)
Dept: MAMMOGRAPHY | Facility: HOSPITAL | Age: 51
Discharge: HOME | End: 2021-08-05
Admitting: NURSE PRACTITIONER

## 2021-08-05 DIAGNOSIS — Z12.31 ENCOUNTER FOR SCREENING MAMMOGRAM FOR MALIGNANT NEOPLASM OF BREAST: ICD-10-CM

## 2021-08-05 PROCEDURE — 77063 BREAST TOMOSYNTHESIS BI: CPT

## 2021-08-05 PROCEDURE — 77067 SCR MAMMO BI INCL CAD: CPT

## 2021-08-06 NOTE — TELEPHONE ENCOUNTER
Caller: Marce Moses    Relationship to patient: Self    Best call back number: [phone]    Patient is needing: PATIENT CALLING FOR LAB RESULTS.

## 2021-09-08 DIAGNOSIS — R92.8 ABNORMAL MAMMOGRAM OF RIGHT BREAST: Primary | ICD-10-CM

## 2021-10-08 ENCOUNTER — APPOINTMENT (OUTPATIENT)
Dept: ULTRASOUND IMAGING | Facility: HOSPITAL | Age: 51
End: 2021-10-08

## 2021-10-08 ENCOUNTER — APPOINTMENT (OUTPATIENT)
Dept: MAMMOGRAPHY | Facility: HOSPITAL | Age: 51
End: 2021-10-08

## 2021-10-12 ENCOUNTER — HOSPITAL ENCOUNTER (OUTPATIENT)
Dept: MAMMOGRAPHY | Facility: HOSPITAL | Age: 51
Discharge: HOME | End: 2021-10-12

## 2021-10-12 ENCOUNTER — APPOINTMENT (OUTPATIENT)
Dept: ULTRASOUND IMAGING | Facility: HOSPITAL | Age: 51
End: 2021-10-12

## 2021-10-12 ENCOUNTER — HOSPITAL ENCOUNTER (OUTPATIENT)
Dept: ULTRASOUND IMAGING | Facility: HOSPITAL | Age: 51
Discharge: HOME | End: 2021-10-12

## 2021-10-12 DIAGNOSIS — R92.8 ABNORMAL MAMMOGRAM OF RIGHT BREAST: ICD-10-CM

## 2021-10-12 PROCEDURE — G0279 TOMOSYNTHESIS, MAMMO: HCPCS

## 2021-10-12 PROCEDURE — 76642 ULTRASOUND BREAST LIMITED: CPT

## 2021-10-12 PROCEDURE — 77065 DX MAMMO INCL CAD UNI: CPT

## 2022-04-04 ENCOUNTER — OFFICE VISIT (OUTPATIENT)
Dept: FAMILY MEDICINE CLINIC | Facility: CLINIC | Age: 52
End: 2022-04-04

## 2022-04-04 VITALS
SYSTOLIC BLOOD PRESSURE: 122 MMHG | HEART RATE: 78 BPM | WEIGHT: 120 LBS | HEIGHT: 62 IN | BODY MASS INDEX: 22.08 KG/M2 | OXYGEN SATURATION: 99 % | DIASTOLIC BLOOD PRESSURE: 74 MMHG | TEMPERATURE: 98 F

## 2022-04-04 DIAGNOSIS — L98.9 SKIN LESIONS, GENERALIZED: ICD-10-CM

## 2022-04-04 DIAGNOSIS — G43.009 MIGRAINE WITHOUT AURA AND WITHOUT STATUS MIGRAINOSUS, NOT INTRACTABLE: ICD-10-CM

## 2022-04-04 DIAGNOSIS — M26.623 BILATERAL TEMPOROMANDIBULAR JOINT PAIN: ICD-10-CM

## 2022-04-04 DIAGNOSIS — R68.84 JAW PAIN: Primary | ICD-10-CM

## 2022-04-04 PROCEDURE — 99214 OFFICE O/P EST MOD 30 MIN: CPT | Performed by: NURSE PRACTITIONER

## 2022-04-04 RX ORDER — RIZATRIPTAN BENZOATE 10 MG/1
10 TABLET, ORALLY DISINTEGRATING ORAL ONCE AS NEEDED
Qty: 27 TABLET | Refills: 1 | Status: SHIPPED | OUTPATIENT
Start: 2022-04-04 | End: 2022-09-26 | Stop reason: SDUPTHER

## 2022-04-04 RX ORDER — CYCLOBENZAPRINE HCL 5 MG
5-10 TABLET ORAL NIGHTLY PRN
Qty: 90 TABLET | Refills: 1 | Status: SHIPPED | OUTPATIENT
Start: 2022-04-04 | End: 2022-09-26

## 2022-04-04 NOTE — ASSESSMENT & PLAN NOTE
Headaches are worsening due to jaw pain.  I will start her on Maxalt to take as needed for migraine headache.  We will work on getting her jaw pain improved to help prevent migraines.  I will have her follow-up in about 3 weeks.  If her headaches/migraines do not improve, will plan to order CT of her head.

## 2022-04-04 NOTE — PROGRESS NOTES
"Chief Complaint  dark spots on face and skin, Jaw Pain, and Headache    Subjective          Marce Moses presents to [facility]  History of Present Illness   51-year-old female presents today with complaints of dark spots on her skin and face, she wants a referral to dermatology.    She also is complaining of jaw pain and popping of her jaw when she opens it.  She had TMJ surgery about 2 years ago.  She states she is also now having headaches/migraine headaches since this started hurting again.  She states when she has headaches that she does have nausea and vomiting sometimes.    She states she has an allergy to the calcium with vitamin D due to the shellfish derivatives.    Current Outpatient Medications on File Prior to Visit   Medication Sig Dispense Refill   • [DISCONTINUED] multivitamin (multivitamin) tablet tablet Take 1 tablet by mouth Daily. 90 tablet 3   • [DISCONTINUED] Calcium Carb-Cholecalciferol (Calcium 500 + D) 500-200 MG-UNIT tablet Take 1 tablet by mouth 2 (two) times a day. 180 tablet 3     No current facility-administered medications on file prior to visit.       Objective   Vital Signs:   /74   Pulse 78   Temp 98 °F (36.7 °C)   Ht 157.5 cm (62\")   Wt 54.4 kg (120 lb)   SpO2 99%   BMI 21.95 kg/m²     Physical Exam  Vitals reviewed.   Constitutional:       Appearance: Normal appearance. She is well-developed.   HENT:      Head:      Jaw: Tenderness and pain on movement present.      Comments: Popping with opening of jaw.  Neck:      Thyroid: No thyroid mass, thyromegaly or thyroid tenderness.   Cardiovascular:      Rate and Rhythm: Normal rate and regular rhythm.      Heart sounds: No murmur heard.    No friction rub. No gallop.   Pulmonary:      Effort: Pulmonary effort is normal.      Breath sounds: Normal breath sounds. No wheezing or rhonchi.   Lymphadenopathy:      Cervical: No cervical adenopathy.   Skin:     General: Skin is warm and dry. "   Neurological:      Mental Status: She is alert and oriented to person, place, and time.      Cranial Nerves: No cranial nerve deficit.   Psychiatric:         Mood and Affect: Mood and affect normal.         Behavior: Behavior normal.         Thought Content: Thought content normal. Thought content does not include homicidal or suicidal ideation.         Judgment: Judgment normal.        Result Review :                 Assessment and Plan    Diagnoses and all orders for this visit:    1. Jaw pain (Primary)  Assessment & Plan:  I will have her get a mandible x-ray.  I will start her on Flexeril 5 to 10 mg every evening at bedtime.  Advised for her to also follow-up with her dentist and possibly get a bite guard to help with her jaw pain.  Advised her to follow-up in about 3 weeks.    Orders:  -     XR Mandible < 4 View; Future    2. Bilateral temporomandibular joint pain  -     XR Mandible < 4 View; Future    3. Migraine without aura and without status migrainosus, not intractable  Assessment & Plan:  Headaches are worsening due to jaw pain.  I will start her on Maxalt to take as needed for migraine headache.  We will work on getting her jaw pain improved to help prevent migraines.  I will have her follow-up in about 3 weeks.  If her headaches/migraines do not improve, will plan to order CT of her head.        4. Skin lesions, generalized  Assessment & Plan:  I will refer her to dermatology.    Orders:  -     Ambulatory Referral to Dermatology    Other orders  -     cyclobenzaprine (FLEXERIL) 5 MG tablet; Take 1-2 tablets by mouth At Night As Needed for Muscle Spasms.  Dispense: 90 tablet; Refill: 1  -     rizatriptan MLT (Maxalt-MLT) 10 MG disintegrating tablet; Place 1 tablet on the tongue 1 (One) Time As Needed for Migraine. May repeat in 2 hours if needed  Dispense: 27 tablet; Refill: 1      Follow Up   Return in about 3 weeks (around 4/25/2022) for Recheck jaw pain, migraines.  Patient was given instructions  and counseling regarding her condition or for health maintenance advice. Please see specific information pulled into the AVS if appropriate.

## 2022-04-04 NOTE — ASSESSMENT & PLAN NOTE
I will have her get a mandible x-ray.  I will start her on Flexeril 5 to 10 mg every evening at bedtime.  Advised for her to also follow-up with her dentist and possibly get a bite guard to help with her jaw pain.  Advised her to follow-up in about 3 weeks.

## 2022-04-11 ENCOUNTER — APPOINTMENT (OUTPATIENT)
Dept: ULTRASOUND IMAGING | Facility: HOSPITAL | Age: 52
End: 2022-04-11

## 2022-04-11 ENCOUNTER — APPOINTMENT (OUTPATIENT)
Dept: MAMMOGRAPHY | Facility: HOSPITAL | Age: 52
End: 2022-04-11

## 2022-04-13 ENCOUNTER — HOSPITAL ENCOUNTER (OUTPATIENT)
Dept: MAMMOGRAPHY | Facility: HOSPITAL | Age: 52
Discharge: HOME | End: 2022-04-13

## 2022-04-13 ENCOUNTER — HOSPITAL ENCOUNTER (OUTPATIENT)
Dept: ULTRASOUND IMAGING | Facility: HOSPITAL | Age: 52
Discharge: HOME | End: 2022-04-13

## 2022-04-13 DIAGNOSIS — R92.8 ABNORMAL MAMMOGRAM OF RIGHT BREAST: ICD-10-CM

## 2022-04-13 PROCEDURE — G0279 TOMOSYNTHESIS, MAMMO: HCPCS

## 2022-04-13 PROCEDURE — 77065 DX MAMMO INCL CAD UNI: CPT

## 2022-05-12 ENCOUNTER — HOSPITAL ENCOUNTER (OUTPATIENT)
Dept: GENERAL RADIOLOGY | Facility: HOSPITAL | Age: 52
Discharge: HOME | End: 2022-05-12
Admitting: NURSE PRACTITIONER

## 2022-05-12 DIAGNOSIS — R68.84 JAW PAIN: ICD-10-CM

## 2022-05-12 DIAGNOSIS — M26.623 BILATERAL TEMPOROMANDIBULAR JOINT PAIN: ICD-10-CM

## 2022-05-12 PROCEDURE — 70110 X-RAY EXAM OF JAW 4/> VIEWS: CPT

## 2022-09-26 ENCOUNTER — OFFICE VISIT (OUTPATIENT)
Dept: FAMILY MEDICINE CLINIC | Facility: CLINIC | Age: 52
End: 2022-09-26

## 2022-09-26 VITALS
WEIGHT: 116 LBS | OXYGEN SATURATION: 99 % | SYSTOLIC BLOOD PRESSURE: 118 MMHG | TEMPERATURE: 96.9 F | BODY MASS INDEX: 21.35 KG/M2 | DIASTOLIC BLOOD PRESSURE: 78 MMHG | HEIGHT: 62 IN | HEART RATE: 62 BPM

## 2022-09-26 DIAGNOSIS — Z13.220 LIPID SCREENING: ICD-10-CM

## 2022-09-26 DIAGNOSIS — Z11.59 ENCOUNTER FOR HEPATITIS C SCREENING TEST FOR LOW RISK PATIENT: ICD-10-CM

## 2022-09-26 DIAGNOSIS — L98.9 SKIN LESIONS, GENERALIZED: ICD-10-CM

## 2022-09-26 DIAGNOSIS — K64.9 HEMORRHOIDS, UNSPECIFIED HEMORRHOID TYPE: ICD-10-CM

## 2022-09-26 DIAGNOSIS — Z00.00 ANNUAL PHYSICAL EXAM: Primary | ICD-10-CM

## 2022-09-26 DIAGNOSIS — R73.01 IFG (IMPAIRED FASTING GLUCOSE): ICD-10-CM

## 2022-09-26 DIAGNOSIS — Z12.31 BREAST CANCER SCREENING BY MAMMOGRAM: ICD-10-CM

## 2022-09-26 LAB
ALBUMIN SERPL-MCNC: 4.6 G/DL (ref 3.5–5.2)
ALBUMIN/GLOB SERPL: 1.9 G/DL
ALP SERPL-CCNC: 64 U/L (ref 39–117)
ALT SERPL W P-5'-P-CCNC: 9 U/L (ref 1–33)
ANION GAP SERPL CALCULATED.3IONS-SCNC: 7 MMOL/L (ref 5–15)
AST SERPL-CCNC: 14 U/L (ref 1–32)
BASOPHILS # BLD AUTO: 0.04 10*3/MM3 (ref 0–0.2)
BASOPHILS NFR BLD AUTO: 1.1 % (ref 0–1.5)
BILIRUB SERPL-MCNC: 0.4 MG/DL (ref 0–1.2)
BUN SERPL-MCNC: 11 MG/DL (ref 6–20)
BUN/CREAT SERPL: 18 (ref 7–25)
CALCIUM SPEC-SCNC: 9.8 MG/DL (ref 8.6–10.5)
CHLORIDE SERPL-SCNC: 104 MMOL/L (ref 98–107)
CHOLEST SERPL-MCNC: 233 MG/DL (ref 0–200)
CO2 SERPL-SCNC: 28 MMOL/L (ref 22–29)
CREAT SERPL-MCNC: 0.61 MG/DL (ref 0.57–1)
DEPRECATED RDW RBC AUTO: 40.7 FL (ref 37–54)
EGFRCR SERPLBLD CKD-EPI 2021: 107.7 ML/MIN/1.73
EOSINOPHIL # BLD AUTO: 0.12 10*3/MM3 (ref 0–0.4)
EOSINOPHIL NFR BLD AUTO: 3.2 % (ref 0.3–6.2)
ERYTHROCYTE [DISTWIDTH] IN BLOOD BY AUTOMATED COUNT: 11.6 % (ref 12.3–15.4)
GLOBULIN UR ELPH-MCNC: 2.4 GM/DL
GLUCOSE SERPL-MCNC: 92 MG/DL (ref 65–99)
HBA1C MFR BLD: 5.4 % (ref 4.8–5.6)
HCT VFR BLD AUTO: 42.2 % (ref 34–46.6)
HCV AB SER DONR QL: NORMAL
HDLC SERPL-MCNC: 89 MG/DL (ref 40–60)
HGB BLD-MCNC: 13.8 G/DL (ref 12–15.9)
IMM GRANULOCYTES # BLD AUTO: 0.01 10*3/MM3 (ref 0–0.05)
IMM GRANULOCYTES NFR BLD AUTO: 0.3 % (ref 0–0.5)
LDLC SERPL CALC-MCNC: 134 MG/DL (ref 0–100)
LDLC/HDLC SERPL: 1.48 {RATIO}
LYMPHOCYTES # BLD AUTO: 1.18 10*3/MM3 (ref 0.7–3.1)
LYMPHOCYTES NFR BLD AUTO: 31.3 % (ref 19.6–45.3)
MCH RBC QN AUTO: 31.1 PG (ref 26.6–33)
MCHC RBC AUTO-ENTMCNC: 32.7 G/DL (ref 31.5–35.7)
MCV RBC AUTO: 95 FL (ref 79–97)
MONOCYTES # BLD AUTO: 0.19 10*3/MM3 (ref 0.1–0.9)
MONOCYTES NFR BLD AUTO: 5 % (ref 5–12)
NEUTROPHILS NFR BLD AUTO: 2.23 10*3/MM3 (ref 1.7–7)
NEUTROPHILS NFR BLD AUTO: 59.1 % (ref 42.7–76)
NRBC BLD AUTO-RTO: 0 /100 WBC (ref 0–0.2)
PLATELET # BLD AUTO: 244 10*3/MM3 (ref 140–450)
PMV BLD AUTO: 10.2 FL (ref 6–12)
POTASSIUM SERPL-SCNC: 4.8 MMOL/L (ref 3.5–5.2)
PROT SERPL-MCNC: 7 G/DL (ref 6–8.5)
RBC # BLD AUTO: 4.44 10*6/MM3 (ref 3.77–5.28)
SODIUM SERPL-SCNC: 139 MMOL/L (ref 136–145)
TRIGL SERPL-MCNC: 60 MG/DL (ref 0–150)
TSH SERPL DL<=0.05 MIU/L-ACNC: 1.38 UIU/ML (ref 0.27–4.2)
VLDLC SERPL-MCNC: 10 MG/DL (ref 5–40)
WBC NRBC COR # BLD: 3.77 10*3/MM3 (ref 3.4–10.8)

## 2022-09-26 PROCEDURE — 80061 LIPID PANEL: CPT | Performed by: NURSE PRACTITIONER

## 2022-09-26 PROCEDURE — 99396 PREV VISIT EST AGE 40-64: CPT | Performed by: NURSE PRACTITIONER

## 2022-09-26 PROCEDURE — 86803 HEPATITIS C AB TEST: CPT | Performed by: NURSE PRACTITIONER

## 2022-09-26 PROCEDURE — 83036 HEMOGLOBIN GLYCOSYLATED A1C: CPT | Performed by: NURSE PRACTITIONER

## 2022-09-26 PROCEDURE — 85025 COMPLETE CBC W/AUTO DIFF WBC: CPT | Performed by: NURSE PRACTITIONER

## 2022-09-26 PROCEDURE — 36415 COLL VENOUS BLD VENIPUNCTURE: CPT | Performed by: NURSE PRACTITIONER

## 2022-09-26 PROCEDURE — 84443 ASSAY THYROID STIM HORMONE: CPT | Performed by: NURSE PRACTITIONER

## 2022-09-26 PROCEDURE — 80053 COMPREHEN METABOLIC PANEL: CPT | Performed by: NURSE PRACTITIONER

## 2022-09-26 RX ORDER — HYDROCORTISONE ACETATE PRAMOXINE HCL 1; 1 G/100G; G/100G
CREAM TOPICAL 2 TIMES DAILY
Qty: 60 G | Refills: 3 | Status: SHIPPED | OUTPATIENT
Start: 2022-09-26 | End: 2022-09-26 | Stop reason: SDUPTHER

## 2022-09-26 NOTE — PROGRESS NOTES
Chief Complaint  Referral request (Dermatology - Dr. Cristi Montanez with [facility] in [place]) and Annual Exam    Subjective          Medical History: has a past medical history of Allergy (1994), GERD (gastroesophageal reflux disease) (01/14/2020), Helicobacter pylori infection (01/14/2020), Hepatitis A (1996), Migraine, Ovarian cyst, TMJ syndrome (1999), Upset stomach, and Vertigo (02/17/2020).     Surgical History: has a past surgical history that includes Temporomandibular joint surgery (1999).     Family History: family history includes Colon cancer in her maternal grandmother and mother; Heart disease in her father, maternal grandmother, and other family members; Stroke in her father; Uterine cancer in an other family member.     Social History: reports that she has never smoked. She has never used smokeless tobacco. She reports that she does not drink alcohol and does not use drugs.    Marce Moses presents to [facility]  History of Present Illness    ENCOUNTER DATE:  09/26/2022    Marce Moses / 52 y.o. / female      CHIEF COMPLAINT    No chief complaint on file.      VITALS    There were no vitals taken for this visit.    BP Readings from Last 3 Encounters:  08/10/22 : 111/78  04/04/22 : 122/74  07/28/21 : 104/72    Wt Readings from Last 3 Encounters:  08/10/22 : 52.2 kg (115 lb)  04/04/22 : 54.4 kg (120 lb)  07/28/21 : 60.2 kg (132 lb 12.8 oz)      HISTORY OF PRESENT ILLNESS    Marce presents for annual health maintenance visit.    PHQ-2: 0 (Not depressed)  PHQ-9: 0 (Negative screening for depression)    Patient Care Team:  Elena Bartlett APRN as PCP - General (Nurse Practitioner)      ALLERGIES  -- Shellfish-Derived Products -- Other (See Comments)   --  Chest pain     PFSH:     The following portions of the patient's history were reviewed and updated as appropriate: Allergies / Current Medications / Past Medical History /  Surgical History / Social History / Family History    PROBLEM LIST   Patient Active Problem List:    GERD (gastroesophageal reflux disease)    Helicobacter pylori gastrointestinal tract infection    Hepatitis A    Migraine    Ovarian cyst    Cervical disc herniation    TMJ syndrome    Jaw pain    Bilateral temporomandibular joint pain    Skin lesions, generalized      PAST MEDICAL HISTORY  Past Medical History:  1994: Allergy  01/14/2020: GERD (gastroesophageal reflux disease)  01/14/2020: Helicobacter pylori infection  1996: Hepatitis A  No date: Migraine  No date: Ovarian cyst  1999: TMJ syndrome  No date: Upset stomach  02/17/2020: Vertigo    SURGICAL HISTORY  Past Surgical History:  1999: TEMPOROMANDIBULAR JOINT SURGERY    SOCIAL HISTORY  Social History   Socioeconomic History     Marital status:    Tobacco Use     Smoking status: Never Smoker     Smokeless tobacco: Never Used   Vaping Use     Vaping Use: Never used      FAMILY HISTORY  Review of patient's family history indicates:  Problem: Colon cancer     Relation: Mother         Age of Onset: (Not Specified)  Problem: Stroke     Relation: Father         Age of Onset: (Not Specified)  Problem: Heart disease     Relation: Father         Age of Onset: (Not Specified)  Problem: Colon cancer     Relation: Maternal Grandmother         Age of Onset: (Not Specified)  Problem: Heart disease     Relation: Maternal Grandmother         Age of Onset: (Not Specified)  Problem: Heart disease     Relation: Other         Age of Onset: (Not Specified)  Problem: Uterine cancer     Relation: Other         Age of Onset: (Not Specified)  Problem: Heart disease     Relation: Other         Age of Onset: (Not Specified)      IMMUNIZATION HISTORY    Immunization History  Administered            Date(s) Administered   COVID-19 (MODERNA) 1st, 2nd, 3rd Dose Only                         02/05/2021 03/04/2021     COVID-19 (MODERNA) BOOSTER                         02/05/2021   03/04/2021     Influenza, Unspecified                         10/01/2019    Labs:    Lab Results      Component                Value               Date                      NA                       137                 07/28/2021                K                        4.1                 07/28/2021                CALCIUM                  8.8                 07/28/2021                AST                      20                  07/28/2021                ALT                      14                  07/28/2021                BUN                      12                  07/28/2021                CREATININE               0.57                07/28/2021                CREATININE               0.57                01/14/2020                EGFRIFNONA               112                 07/28/2021                EGFRIFAFRI               136                 07/28/2021              Lab Results      Component                Value               Date                      HGBA1C                   5.3                 05/13/2019                TSH                      2.450               07/28/2021              Lab Results      Component                Value               Date                      LDL                      136 (H)             07/28/2021                HDL                      64 (H)              07/28/2021                TRIG                     71                  07/28/2021              No results found for: [id number]     Lab Results      Component                Value               Date                      WBC                      6.09                07/28/2021                HGB                      13.2                07/28/2021                MCV                      93.9                07/28/2021                PLT                      280                 07/28/2021              Lab Results      Component                Value               Date                      GLUCOSEU                 NEGATIVE             "05/13/2019                BLOODU                   NEGATIVE            05/13/2019                NITRITEU                 NEGATIVE            05/13/2019                LEUKOCYTESUR             NEGATIVE            05/13/2019              No results found for: HEPCVIRUSABY        ASSESSMENT & PLAN    ANNUAL WELLNESS EXAM / PHYSICAL     HEALTHCARE MAINTENANCE ISSUES    Cancer Screening:  Colon: Initial/Next screening at age: CURRENT  Repeat colon cancer screening: every 3-5 years  Breast: Recommended monthly self exams; annual professional exam  Mammogram: every 1 year  Cervical: 3 years  Skin: Monthly self skin examination, annual exam by health professional      Lifestyle Counseling:  Lifestyle Modifications: Continue good lifestyle choices/modifications  Safety Issues: Always wear seatbelt, Avoid texting while driving   Use sunscreen, regular skin examination  Recommended annual dental/vision examination.  Emotional/Stress/Sleep: Reviewed and  given when appropriate  Hemorrhoids  This is a new problem. The current episode started 1 to 4 weeks ago. The problem occurs intermittently. The problem has been waxing and waning. Pertinent negatives include no abdominal pain, congestion, fever, headaches, nausea or vomiting. Exacerbated by: diarrhea. She has tried nothing for the symptoms. The treatment provided no relief.       Objective   Vital Signs:   /78 (BP Location: Left arm, Patient Position: Sitting, Cuff Size: Adult)   Pulse 62   Temp 96.9 °F (36.1 °C) (Infrared)   Ht 157.5 cm (62\")   Wt 52.6 kg (116 lb)   SpO2 99%   BMI 21.22 kg/m²     Physical Exam  Vitals reviewed.   Constitutional:       Appearance: Normal appearance. She is well-developed.   HENT:      Head: Normocephalic and atraumatic.      Right Ear: Tympanic membrane and external ear normal.      Left Ear: Tympanic membrane and external ear normal.   Eyes:      Conjunctiva/sclera: Conjunctivae normal.      Pupils: Pupils are equal, " round, and reactive to light.   Cardiovascular:      Rate and Rhythm: Normal rate and regular rhythm.      Heart sounds: No murmur heard.    No friction rub.   Pulmonary:      Effort: Pulmonary effort is normal.      Breath sounds: Normal breath sounds. No wheezing or rhonchi.   Musculoskeletal:      Right lower leg: No edema.      Left lower leg: No edema.   Skin:     General: Skin is warm and dry.   Neurological:      Mental Status: She is alert and oriented to person, place, and time.   Psychiatric:         Mood and Affect: Mood and affect normal.         Behavior: Behavior normal.         Thought Content: Thought content normal.         Judgment: Judgment normal.        Result Review :   The following data was reviewed by: JONAS Caba on 09/26/2022:      Data reviewed: Previous Elena Bartlett note            Current Outpatient Medications on File Prior to Visit   Medication Sig Dispense Refill   • hydroquinone 4 % cream      • rizatriptan MLT (Maxalt-MLT) 10 MG disintegrating tablet Place 1 tablet on the tongue 1 (One) Time As Needed for Migraine. May repeat in 2 hours if needed 27 tablet 1   • [DISCONTINUED] cyclobenzaprine (FLEXERIL) 5 MG tablet Take 1-2 tablets by mouth At Night As Needed for Muscle Spasms. 90 tablet 1   • [DISCONTINUED] metroNIDAZOLE (METROGEL) 0.75 % gel        No current facility-administered medications on file prior to visit.        Assessment and Plan    Diagnoses and all orders for this visit:    1. Annual physical exam (Primary)  -     CBC & Differential  -     Comprehensive Metabolic Panel  -     TSH    2. Encounter for hepatitis C screening test for low risk patient  Comments:  We will do a one-time screening for hepatitis C.  Orders:  -     Hepatitis C antibody    3. Skin lesions, generalized  -     Ambulatory Referral to Dermatology    4. Breast cancer screening by mammogram  Comments:  Patient had a diagnostic mammogram in April she is due for a screening  mammogram we will order that today.  Orders:  -     Mammo Screening Digital Tomosynthesis Bilateral With CAD; Future    5. Lipid screening  Comments:  We will check lipid screening.  Orders:  -     Lipid Panel    6. IFG (impaired fasting glucose)  Comments:  We will check labs to rule out diabetes.  Orders:  -     Hemoglobin A1c    7. Hemorrhoids, unspecified hemorrhoid type  Comments:  We will send over hydrocortisone cream and Tucks pads to use as needed.  Patient has a history of IBS, with diarrhea patient develops the irritation with the he    Other orders  -     witch hazel-glycerin (TUCKS) pad; Insert  into the rectum As Needed for Irritation or Hemorrhoids.  Dispense: 100 each; Refill: 12  -     Hydrocortisone Ace-Pramoxine 1-1 % rectal cream; Insert  into the rectum 2 (Two) Times a Day.  Dispense: 60 g; Refill: 3  -     calcium-vitamin D 250-100 MG-UNIT per tablet; Take 1 tablet by mouth 2 (Two) Times a Day.  Dispense: 180 tablet; Refill: 3        Follow Up   Return in about 1 year (around 9/26/2023) for Annual physical.  Patient was given instructions and counseling regarding her condition or for health maintenance advice. Please see specific information pulled into the AVS if appropriate.

## 2022-09-26 NOTE — TELEPHONE ENCOUNTER
Caller: Marce Moses    Relationship: Self    Best call back number: [phone]    Requested Prescriptions:   Requested Prescriptions     Pending Prescriptions Disp Refills   • Hydrocortisone Ace-Pramoxine 1-1 % rectal cream 60 g 3     Sig: Insert  into the rectum 2 (Two) Times a Day.   • witch hazel-glycerin (TUCKS) pad 100 each 12     Sig: Insert  into the rectum As Needed for Irritation or Hemorrhoids.   • calcium-vitamin D 250-100 MG-UNIT per tablet 180 tablet 3     Sig: Take 1 tablet by mouth 2 (Two) Times a Day.   • hydroquinone 4 % cream     • rizatriptan MLT (Maxalt-MLT) 10 MG disintegrating tablet 27 tablet 1     Sig: Place 1 tablet on the tongue 1 (One) Time As Needed for Migraine. May repeat in 2 hours if needed        Pharmacy where request should be sent: [id number] - [place], [place] - [address] [place] [address] AT [facility] [address] /[facility] & [place] - [phone] [facility] [phone] FX     Additional details provided by patient: PATIENT REQUESTS MEDICATION TO BE SENT TO [facility]    Does the patient have less than a 3 day supply:  [x] Yes  [] No    Letty Hartman Rep   09/26/22 10:43 EDT

## 2022-09-28 RX ORDER — HYDROCORTISONE ACETATE PRAMOXINE HCL 1; 1 G/100G; G/100G
CREAM TOPICAL 2 TIMES DAILY
Qty: 60 G | Refills: 3 | Status: SHIPPED | OUTPATIENT
Start: 2022-09-28

## 2022-09-28 RX ORDER — HYDROQUINONE 40 MG/G
CREAM TOPICAL 2 TIMES DAILY
Qty: 60 G | Refills: 3 | Status: SHIPPED | OUTPATIENT
Start: 2022-09-28

## 2022-09-28 RX ORDER — RIZATRIPTAN BENZOATE 10 MG/1
10 TABLET, ORALLY DISINTEGRATING ORAL ONCE AS NEEDED
Qty: 27 TABLET | Refills: 1 | Status: SHIPPED | OUTPATIENT
Start: 2022-09-28

## 2022-12-09 ENCOUNTER — TELEPHONE (OUTPATIENT)
Dept: FAMILY MEDICINE CLINIC | Facility: CLINIC | Age: 52
End: 2022-12-09

## 2022-12-09 RX ORDER — DEXTROMETHORPHAN HYDROBROMIDE AND PROMETHAZINE HYDROCHLORIDE 15; 6.25 MG/5ML; MG/5ML
5 SOLUTION ORAL 4 TIMES DAILY PRN
Qty: 300 ML | Refills: 0 | Status: SHIPPED | OUTPATIENT
Start: 2022-12-09 | End: 2022-12-19

## 2022-12-09 RX ORDER — GUAIFENESIN 600 MG/1
1200 TABLET, EXTENDED RELEASE ORAL 2 TIMES DAILY
Qty: 30 TABLET | Refills: 1 | Status: SHIPPED | OUTPATIENT
Start: 2022-12-09

## 2022-12-09 RX ORDER — DEXAMETHASONE 6 MG/1
6 TABLET ORAL 2 TIMES DAILY WITH MEALS
Qty: 10 TABLET | Refills: 0 | Status: SHIPPED | OUTPATIENT
Start: 2022-12-09

## 2022-12-09 NOTE — TELEPHONE ENCOUNTER
Patient informed myra crandall sent in 3 medications to [facility]'s pharmacy to treat her symptoms. Patient voiced understanding

## 2022-12-09 NOTE — TELEPHONE ENCOUNTER
Caller: Marce Moses    Relationship to patient: Self    Best call back number: [phone]    Date of positive COVID19 test: 12/09/22    COVID19 symptoms: FEVER, CHILLS, SORE THROAT    Additional information or concerns: THE PATIENT STATED SHE HAS TESTED POSITIVE FOR COVID AND WOULD LIKE PCP TO PRESCRIBE MEDICATION FOR HER. SHE WOULD LIKE A CALL BACK TO CONFIRM    What is the patients preferred pharmacy: [facility] [place] - [address] [facility] [phone] [facility] [phone]   [phone]

## 2023-12-08 ENCOUNTER — HOSPITAL ENCOUNTER (OUTPATIENT)
Dept: BONE DENSITY | Facility: HOSPITAL | Age: 53
Discharge: HOME | End: 2023-12-08
Payer: OTHER GOVERNMENT

## 2023-12-08 ENCOUNTER — HOSPITAL ENCOUNTER (OUTPATIENT)
Dept: MAMMOGRAPHY | Facility: HOSPITAL | Age: 53
Discharge: HOME | End: 2023-12-08
Payer: OTHER GOVERNMENT

## 2023-12-08 DIAGNOSIS — Z12.31 BREAST CANCER SCREENING BY MAMMOGRAM: ICD-10-CM

## 2023-12-08 DIAGNOSIS — Z78.0 POST-MENOPAUSAL: ICD-10-CM

## 2023-12-08 PROCEDURE — 77063 BREAST TOMOSYNTHESIS BI: CPT

## 2023-12-08 PROCEDURE — 77067 SCR MAMMO BI INCL CAD: CPT

## 2023-12-08 PROCEDURE — 77080 DXA BONE DENSITY AXIAL: CPT

## 2023-12-11 NOTE — PROGRESS NOTES
Called and spoke to Marce Moses to relay results. Pt verbalized understanding. No further questions/concerns at this time.

## 2024-02-02 ENCOUNTER — OFFICE VISIT (OUTPATIENT)
Dept: FAMILY MEDICINE CLINIC | Facility: CLINIC | Age: 54
End: 2024-02-02
Payer: OTHER GOVERNMENT

## 2024-02-02 VITALS
WEIGHT: 116.7 LBS | BODY MASS INDEX: 21.47 KG/M2 | OXYGEN SATURATION: 99 % | DIASTOLIC BLOOD PRESSURE: 60 MMHG | SYSTOLIC BLOOD PRESSURE: 100 MMHG | TEMPERATURE: 99.1 F | HEIGHT: 62 IN | HEART RATE: 75 BPM

## 2024-02-02 DIAGNOSIS — Z00.00 ANNUAL PHYSICAL EXAM: Primary | ICD-10-CM

## 2024-02-02 DIAGNOSIS — Z11.7 ENCOUNTER FOR TESTING FOR LATENT TUBERCULOSIS: ICD-10-CM

## 2024-02-02 PROCEDURE — 86580 TB INTRADERMAL TEST: CPT

## 2024-02-02 PROCEDURE — 99396 PREV VISIT EST AGE 40-64: CPT

## 2024-02-02 RX ORDER — DESONIDE 0.5 MG/G
CREAM TOPICAL
COMMUNITY
Start: 2023-10-11

## 2024-02-02 RX ORDER — CALCIUM CARBONATE 500(1250)
TABLET ORAL
COMMUNITY
Start: 2024-01-16

## 2024-02-02 RX ORDER — TRANEXAMIC ACID 650 MG/1
TABLET ORAL
COMMUNITY
Start: 2024-01-15

## 2024-02-02 RX ORDER — AZELAIC ACID 0.15 G/G
GEL TOPICAL
COMMUNITY
Start: 2023-11-30

## 2024-02-02 RX ORDER — DOXYCYCLINE HYCLATE 100 MG/1
CAPSULE ORAL
COMMUNITY
Start: 2024-01-22

## 2024-02-02 NOTE — PROGRESS NOTES
Chief Complaint  Chief Complaint   Patient presents with    Annual Exam    paperwork        HPI:  Marce Moses presents to [facility]    Procedures     Past History:  Medical History: has a past medical history of Allergy (1994), GERD (gastroesophageal reflux disease) (01/14/2020), Helicobacter pylori infection (01/14/2020), Hepatitis A (1996), Migraine, Ovarian cyst, TMJ syndrome (1999), Upset stomach, and Vertigo (02/17/2020).   Surgical History: has a past surgical history that includes Temporomandibular joint surgery (1999).   Family History: family history includes Colon cancer in her maternal grandmother and mother; Heart disease in her father, maternal grandmother, and other family members; Stroke in her father; Uterine cancer in an other family member.   Social History: reports that she has never smoked. She has never used smokeless tobacco. She reports that she does not drink alcohol and does not use drugs.  Immunization History   Administered Date(s) Administered    COVID-19 (MODERNA) 1st,2nd,3rd Dose Monovalent 02/05/2021, 03/04/2021    COVID-19 (MODERNA) Monovalent Original Booster 02/05/2021, 03/04/2021    Influenza, Unspecified 10/01/2019    PPD Test 02/02/2024         Allergies: Shellfish-derived products     Medications:  Current Outpatient Medications on File Prior to Visit   Medication Sig Dispense Refill    azelaic acid (AZELEX) 15 % gel       calcium carbonate (OS-KRUNAL) 1250 (500 Ca) MG tablet Take 1 tablet by mouth Daily. 90 tablet 1    calcium carbonate, oyster shell, 500 MG tablet tablet       desonide (DESOWEN) 0.05 % cream APPLY TOPICALLY TO THE AFFECTED AREA TWICE DAILY AS NEEDED FOR ITCHY SKIN      doxycycline (VIBRAMYCIN) 100 MG capsule       rizatriptan MLT (Maxalt-MLT) 10 MG disintegrating tablet Place 1 tablet on the tongue 1 (One) Time As Needed for Migraine. May repeat in 2 hours if needed 27 tablet 1    Tranexamic Acid 650 MG tablet       Vitamin D,  "Cholecalciferol, 25 MCG (1000 UT) capsule Take 1 capsule by mouth Daily. 90 capsule 1     No current facility-administered medications on file prior to visit.        Health Maintenance Due   Topic Date Due    TDAP/TD VACCINES (1 - Tdap) Never done    ZOSTER VACCINE (1 of 2) Never done    INFLUENZA VACCINE  08/01/2023    COVID-19 Vaccine (5 - 2023-24 season) 09/01/2023       Vital Signs:   Vitals:    02/02/24 1457   BP: 100/60   Pulse: 75   Temp: 99.1 °F (37.3 °C)   SpO2: 99%   Weight: 52.9 kg (116 lb 11.2 oz)   Height: 157.5 cm (62\")      Body mass index is 21.34 kg/m².     ROS:  Review of Systems       Physical Exam  Vitals and nursing note reviewed.   Constitutional:       Appearance: Normal appearance. She is normal weight.   HENT:      Head: Normocephalic and atraumatic.      Right Ear: Tympanic membrane, ear canal and external ear normal.      Left Ear: Tympanic membrane, ear canal and external ear normal.      Nose: Nose normal.      Mouth/Throat:      Mouth: Mucous membranes are moist.   Eyes:      Conjunctiva/sclera: Conjunctivae normal.      Pupils: Pupils are equal, round, and reactive to light.   Cardiovascular:      Rate and Rhythm: Normal rate and regular rhythm.      Pulses: Normal pulses.      Heart sounds: Normal heart sounds.   Pulmonary:      Effort: Pulmonary effort is normal.      Breath sounds: Normal breath sounds.   Abdominal:      General: Abdomen is flat.      Palpations: Abdomen is soft.   Musculoskeletal:         General: Normal range of motion.      Cervical back: Normal range of motion and neck supple.   Skin:     General: Skin is warm and dry.   Neurological:      General: No focal deficit present.      Mental Status: She is alert and oriented to person, place, and time. Mental status is at baseline.   Psychiatric:         Mood and Affect: Mood normal.         Behavior: Behavior normal.         Thought Content: Thought content normal.         Judgment: Judgment normal.          Result " Review        Diagnoses and all orders for this visit:    1. Annual physical exam (Primary)  Comments:  Patient is wanting paperwork that shows that she had a physical exam in office today.  We will get TB time test and then she will get paperwork    2. Encounter for testing for latent tuberculosis  Comments:  Patient will have TB time test and will come back on Monday to have results read and have paperwork filled out.  Orders:  -     TB Skin Test             Follow Up   No follow-ups on file.  Patient was given instructions and counseling regarding her condition or for health maintenance advice. Please see specific information pulled into the AVS if appropriate.     Healthy female exam.   Annual physical exam [Z00.00]     1.   2. Patient Counseling:  --Nutrition: Stressed importance of moderation in sodium/caffeine intake, saturated fat and cholesterol, caloric balance, sufficient intake of fresh fruits, vegetables, fiber, calcium, iron  --Exercise: Stressed the importance of regular exercise.   --Injury prevention: Discussed safety belts, safety helmets, smoke detector, smoking near bedding or upholstery.   --Dental health: Discussed importance of regular tooth brushing, flossing, and dental visits.  --Immunizations reviewed.    3. Discussed the patient's BMI with her.  The BMI is in the acceptable range  4. Follow up next physical in 1 year      The patient is advised to continue current medications, continue current healthy lifestyle patterns, and return for routine annual checkups.         JONAS Barton

## 2024-02-05 ENCOUNTER — CLINICAL SUPPORT (OUTPATIENT)
Dept: FAMILY MEDICINE CLINIC | Facility: CLINIC | Age: 54
End: 2024-02-05
Payer: OTHER GOVERNMENT

## 2024-02-05 LAB
INDURATION: 0 MM (ref 0–10)
Lab: NORMAL
Lab: NORMAL
TB SKIN TEST: NEGATIVE

## 2024-06-03 ENCOUNTER — TELEPHONE (OUTPATIENT)
Dept: FAMILY MEDICINE CLINIC | Facility: CLINIC | Age: 54
End: 2024-06-03
Payer: OTHER GOVERNMENT

## 2024-06-03 NOTE — TELEPHONE ENCOUNTER
PT SCHEDULED APPT FOR PHYSICAL/PAP AND WANTS LABWORK. WANTS PCP TO PLEASE PUT IN LAB ORDERS PRIOR SO SHE CAN DISCUSS AT APPOINTMENT. APPOINTMENT IS ON 6/7

## 2024-06-05 ENCOUNTER — CLINICAL SUPPORT (OUTPATIENT)
Dept: FAMILY MEDICINE CLINIC | Facility: CLINIC | Age: 54
End: 2024-06-05
Payer: OTHER GOVERNMENT

## 2024-06-05 DIAGNOSIS — Z13.220 LIPID SCREENING: ICD-10-CM

## 2024-06-05 DIAGNOSIS — E55.9 VITAMIN D DEFICIENCY: ICD-10-CM

## 2024-06-05 DIAGNOSIS — Z13.29 THYROID DISORDER SCREENING: ICD-10-CM

## 2024-06-05 DIAGNOSIS — E55.9 VITAMIN D DEFICIENCY: Primary | ICD-10-CM

## 2024-06-05 LAB
25(OH)D3 SERPL-MCNC: 21.3 NG/ML (ref 30–100)
ALBUMIN SERPL-MCNC: 4.4 G/DL (ref 3.5–5.2)
ALBUMIN/GLOB SERPL: 1.3 G/DL
ALP SERPL-CCNC: 74 U/L (ref 39–117)
ALT SERPL W P-5'-P-CCNC: 9 U/L (ref 1–33)
ANION GAP SERPL CALCULATED.3IONS-SCNC: 9.9 MMOL/L (ref 5–15)
AST SERPL-CCNC: 16 U/L (ref 1–32)
BASOPHILS # BLD AUTO: 0.03 10*3/MM3 (ref 0–0.2)
BASOPHILS NFR BLD AUTO: 0.7 % (ref 0–1.5)
BILIRUB SERPL-MCNC: 0.4 MG/DL (ref 0–1.2)
BUN SERPL-MCNC: 9 MG/DL (ref 6–20)
BUN/CREAT SERPL: 15 (ref 7–25)
CALCIUM SPEC-SCNC: 9.6 MG/DL (ref 8.6–10.5)
CHLORIDE SERPL-SCNC: 104 MMOL/L (ref 98–107)
CHOLEST SERPL-MCNC: 242 MG/DL (ref 0–200)
CO2 SERPL-SCNC: 27.1 MMOL/L (ref 22–29)
CREAT SERPL-MCNC: 0.6 MG/DL (ref 0.57–1)
DEPRECATED RDW RBC AUTO: 40.7 FL (ref 37–54)
EGFRCR SERPLBLD CKD-EPI 2021: 107.5 ML/MIN/1.73
EOSINOPHIL # BLD AUTO: 0.07 10*3/MM3 (ref 0–0.4)
EOSINOPHIL NFR BLD AUTO: 1.6 % (ref 0.3–6.2)
ERYTHROCYTE [DISTWIDTH] IN BLOOD BY AUTOMATED COUNT: 11.8 % (ref 12.3–15.4)
GLOBULIN UR ELPH-MCNC: 3.4 GM/DL
GLUCOSE SERPL-MCNC: 89 MG/DL (ref 65–99)
HCT VFR BLD AUTO: 42.7 % (ref 34–46.6)
HDLC SERPL-MCNC: 85 MG/DL (ref 40–60)
HGB BLD-MCNC: 13.7 G/DL (ref 12–15.9)
IMM GRANULOCYTES # BLD AUTO: 0.01 10*3/MM3 (ref 0–0.05)
IMM GRANULOCYTES NFR BLD AUTO: 0.2 % (ref 0–0.5)
LDLC SERPL CALC-MCNC: 141 MG/DL (ref 0–100)
LDLC/HDLC SERPL: 1.63 {RATIO}
LYMPHOCYTES # BLD AUTO: 1.42 10*3/MM3 (ref 0.7–3.1)
LYMPHOCYTES NFR BLD AUTO: 32.8 % (ref 19.6–45.3)
MCH RBC QN AUTO: 30.1 PG (ref 26.6–33)
MCHC RBC AUTO-ENTMCNC: 32.1 G/DL (ref 31.5–35.7)
MCV RBC AUTO: 93.8 FL (ref 79–97)
MONOCYTES # BLD AUTO: 0.22 10*3/MM3 (ref 0.1–0.9)
MONOCYTES NFR BLD AUTO: 5.1 % (ref 5–12)
NEUTROPHILS NFR BLD AUTO: 2.58 10*3/MM3 (ref 1.7–7)
NEUTROPHILS NFR BLD AUTO: 59.6 % (ref 42.7–76)
NRBC BLD AUTO-RTO: 0 /100 WBC (ref 0–0.2)
PLATELET # BLD AUTO: 253 10*3/MM3 (ref 140–450)
PMV BLD AUTO: 9.9 FL (ref 6–12)
POTASSIUM SERPL-SCNC: 3.9 MMOL/L (ref 3.5–5.2)
PROT SERPL-MCNC: 7.8 G/DL (ref 6–8.5)
RBC # BLD AUTO: 4.55 10*6/MM3 (ref 3.77–5.28)
SODIUM SERPL-SCNC: 141 MMOL/L (ref 136–145)
TRIGL SERPL-MCNC: 92 MG/DL (ref 0–150)
TSH SERPL DL<=0.05 MIU/L-ACNC: 1.59 UIU/ML (ref 0.27–4.2)
VLDLC SERPL-MCNC: 16 MG/DL (ref 5–40)
WBC NRBC COR # BLD AUTO: 4.33 10*3/MM3 (ref 3.4–10.8)

## 2024-06-05 PROCEDURE — 80061 LIPID PANEL: CPT | Performed by: NURSE PRACTITIONER

## 2024-06-05 PROCEDURE — 82306 VITAMIN D 25 HYDROXY: CPT | Performed by: NURSE PRACTITIONER

## 2024-06-05 PROCEDURE — 80053 COMPREHEN METABOLIC PANEL: CPT | Performed by: NURSE PRACTITIONER

## 2024-06-05 PROCEDURE — 85025 COMPLETE CBC W/AUTO DIFF WBC: CPT | Performed by: NURSE PRACTITIONER

## 2024-06-05 PROCEDURE — 84443 ASSAY THYROID STIM HORMONE: CPT | Performed by: NURSE PRACTITIONER

## 2024-06-05 PROCEDURE — 36415 COLL VENOUS BLD VENIPUNCTURE: CPT | Performed by: NURSE PRACTITIONER

## 2024-06-06 RX ORDER — ERGOCALCIFEROL 1.25 MG/1
50000 CAPSULE ORAL WEEKLY
Qty: 13 CAPSULE | Refills: 1 | Status: SHIPPED | OUTPATIENT
Start: 2024-06-06

## 2024-06-06 RX ORDER — ATORVASTATIN CALCIUM 10 MG/1
10 TABLET, FILM COATED ORAL DAILY
Qty: 90 TABLET | Refills: 1 | Status: SHIPPED | OUTPATIENT
Start: 2024-06-06

## 2024-06-07 ENCOUNTER — OFFICE VISIT (OUTPATIENT)
Dept: FAMILY MEDICINE CLINIC | Facility: CLINIC | Age: 54
End: 2024-06-07
Payer: OTHER GOVERNMENT

## 2024-06-07 VITALS
HEART RATE: 53 BPM | BODY MASS INDEX: 21.35 KG/M2 | OXYGEN SATURATION: 100 % | SYSTOLIC BLOOD PRESSURE: 126 MMHG | HEIGHT: 62 IN | DIASTOLIC BLOOD PRESSURE: 86 MMHG | WEIGHT: 116 LBS | TEMPERATURE: 97.8 F

## 2024-06-07 DIAGNOSIS — E63.9 POOR EATING HABITS: ICD-10-CM

## 2024-06-07 DIAGNOSIS — E55.9 VITAMIN D DEFICIENCY: ICD-10-CM

## 2024-06-07 DIAGNOSIS — Z86.010 ENCOUNTER FOR COLONOSCOPY DUE TO HISTORY OF COLONIC POLYP: ICD-10-CM

## 2024-06-07 DIAGNOSIS — Z09 FOLLOW-UP EXAM: Primary | ICD-10-CM

## 2024-06-07 DIAGNOSIS — E78.2 MIXED HYPERLIPIDEMIA: ICD-10-CM

## 2024-06-07 DIAGNOSIS — Z12.11 ENCOUNTER FOR COLONOSCOPY DUE TO HISTORY OF COLONIC POLYP: ICD-10-CM

## 2024-06-07 LAB — HBA1C MFR BLD: 5.7 % (ref 4.8–5.6)

## 2024-06-07 PROCEDURE — 83036 HEMOGLOBIN GLYCOSYLATED A1C: CPT | Performed by: NURSE PRACTITIONER

## 2024-06-07 PROCEDURE — 99214 OFFICE O/P EST MOD 30 MIN: CPT | Performed by: NURSE PRACTITIONER

## 2024-06-07 RX ORDER — TRIAMCINOLONE ACETONIDE 1 MG/G
1 CREAM TOPICAL 2 TIMES DAILY
Qty: 45 G | Refills: 1 | Status: SHIPPED | OUTPATIENT
Start: 2024-06-07

## 2024-06-07 RX ORDER — TOPIRAMATE 25 MG/1
25 TABLET ORAL NIGHTLY
Qty: 30 TABLET | Refills: 1 | Status: SHIPPED | OUTPATIENT
Start: 2024-06-07

## 2024-06-07 NOTE — PROGRESS NOTES
"  ENCOUNTER DATE:  06/07/2024    Marce Moses / 53 y.o. / female      CHIEF COMPLAINT     Follow-up      VITALS     Visit Vitals  /86 (BP Location: Right arm, Patient Position: Sitting, Cuff Size: Adult)   Pulse 53   Temp 97.8 °F (36.6 °C) (Infrared)   Ht 157.5 cm (62\")   Wt 52.6 kg (116 lb)   SpO2 100%   BMI 21.22 kg/m²       BP Readings from Last 3 Encounters:   06/07/24 126/86   02/02/24 100/60   07/19/23 98/70     Wt Readings from Last 3 Encounters:   06/07/24 52.6 kg (116 lb)   02/02/24 52.9 kg (116 lb 11.2 oz)   07/19/23 53.7 kg (118 lb 6.4 oz)      Body mass index is 21.22 kg/m².    MEDICATIONS     Current Outpatient Medications on File Prior to Visit   Medication Sig Dispense Refill    atorvastatin (LIPITOR) 10 MG tablet Take 1 tablet by mouth Daily. 90 tablet 1    azelaic acid (AZELEX) 15 % gel       calcium carbonate (OS-KRUNAL) 1250 (500 Ca) MG tablet Take 1 tablet by mouth Daily. 90 tablet 1    calcium carbonate, oyster shell, 500 MG tablet tablet       desonide (DESOWEN) 0.05 % cream APPLY TOPICALLY TO THE AFFECTED AREA TWICE DAILY AS NEEDED FOR ITCHY SKIN      doxycycline (VIBRAMYCIN) 100 MG capsule       rizatriptan MLT (Maxalt-MLT) 10 MG disintegrating tablet Place 1 tablet on the tongue 1 (One) Time As Needed for Migraine. May repeat in 2 hours if needed 27 tablet 1    Tranexamic Acid 650 MG tablet       vitamin D (ERGOCALCIFEROL) 1.25 MG (28509 UT) capsule capsule Take 1 capsule by mouth 1 (One) Time Per Week. 13 capsule 1    Vitamin D, Cholecalciferol, 25 MCG (1000 UT) capsule Take 1 capsule by mouth Daily. 90 capsule 1    vitamin E 100 UNIT capsule Take 1 capsule by mouth Daily.       No current facility-administered medications on file prior to visit.         HISTORY OF PRESENT ILLNESS     Marce presents for annual health maintenance visit.  Lab Results   Component Value Date    HPVAPTIMA Negative 07/19/2023   }  Domestic abuse concerns: No   Depression Screening:          PHQ-9 Depression " Screening  Little interest or pleasure in doing things? 0-->not at all   Feeling down, depressed, or hopeless? 0-->not at all   Trouble falling or staying asleep, or sleeping too much?     Feeling tired or having little energy?     Poor appetite or overeating?     Feeling bad about yourself - or that you are a failure or have let yourself or your family down?     Trouble concentrating on things, such as reading the newspaper or watching television?     Moving or speaking so slowly that other people could have noticed? Or the opposite - being so fidgety or restless that you have been moving around a lot more than usual?     Thoughts that you would be better off dead, or of hurting yourself in some way?     PHQ-9 Total Score 0   If you checked off any problems, how difficult have these problems made it for you to do your work, take care of things at home, or get along with other people?           JOSELYN-7           Patient Care Team:  Adolfo Singh APRN as PCP - General (Nurse Practitioner)      ALLERGIES  Allergies   Allergen Reactions    Shellfish-Derived Products Other (See Comments)     Chest pain        PFSH:     The following portions of the patient's history were reviewed and updated as appropriate: Allergies / Current Medications / Past Medical History / Surgical History / Social History / Family History    PROBLEM LIST   Patient Active Problem List   Diagnosis    GERD (gastroesophageal reflux disease)    Helicobacter pylori gastrointestinal tract infection    Hepatitis A    Migraine    Ovarian cyst    Cervical disc herniation    TMJ syndrome    Jaw pain    Bilateral temporomandibular joint pain    Skin lesions, generalized       PAST MEDICAL HISTORY  Past Medical History:   Diagnosis Date    Allergy 1994    GERD (gastroesophageal reflux disease) 01/14/2020    Helicobacter pylori infection 01/14/2020    Hepatitis A 1996    Migraine     Ovarian cyst     TMJ syndrome 1999    Upset stomach     Vertigo  02/17/2020       SURGICAL HISTORY  Past Surgical History:   Procedure Laterality Date    TEMPOROMANDIBULAR JOINT SURGERY  1999       SOCIAL HISTORY  Social History     Socioeconomic History    Marital status:    Tobacco Use    Smoking status: Never    Smokeless tobacco: Never   Vaping Use    Vaping status: Never Used   Substance and Sexual Activity    Alcohol use: Never    Drug use: Never    Sexual activity: Yes       FAMILY HISTORY  Family History   Problem Relation Age of Onset    Colon cancer Mother     Stroke Father     Heart disease Father     Colon cancer Maternal Grandmother     Heart disease Maternal Grandmother     Heart disease Other     Uterine cancer Other     Heart disease Other        IMMUNIZATION HISTORY  Immunization History   Administered Date(s) Administered    COVID-19 (MODERNA) 1st,2nd,3rd Dose Monovalent 02/05/2021, 03/04/2021    COVID-19 (MODERNA) Monovalent Original Booster 02/05/2021, 03/04/2021    Influenza, Unspecified 10/01/2019    PPD Test 02/02/2024       HPI  Insect Bite  This is a new problem. The current episode started 1 to 4 weeks ago. Pertinent negatives include no arthralgias, chills, diaphoresis, headaches, nausea or swollen glands. Nothing aggravates the symptoms. Treatments tried: Itching cream. The treatment provided mild relief.     Hyperlipidemia  This is a chronic problem. The current episode started more than 1 year ago. The problem is controlled. There are no known factors aggravating her hyperlipidemia. Pertinent negatives include no leg pain or myalgias. Current antihyperlipidemic treatment includes statins. The current treatment provides significant improvement of lipids. There are no compliance problems.  Risk factors for coronary artery disease include post-menopausal.     Physical Exam  Vitals reviewed.   Constitutional:       General: She is not in acute distress.     Appearance: Normal appearance. She is well-developed and normal weight. She is not  ill-appearing.   HENT:      Head: Normocephalic and atraumatic.   Eyes:      Conjunctiva/sclera: Conjunctivae normal.      Pupils: Pupils are equal, round, and reactive to light.   Cardiovascular:      Rate and Rhythm: Normal rate and regular rhythm.      Heart sounds: No murmur heard.  Pulmonary:      Effort: Pulmonary effort is normal.      Breath sounds: Normal breath sounds. No wheezing.   Skin:     General: Skin is warm and dry.      Comments: Multiple insect bites right posterior shoulder, mild erythema around the bites, no cellulitic changes, or drainage   Neurological:      Mental Status: She is alert and oriented to person, place, and time.   Psychiatric:         Mood and Affect: Mood and affect normal.         Behavior: Behavior normal.         Thought Content: Thought content normal.         Judgment: Judgment normal.         REVIEWED DATA      Labs:    Lab Results   Component Value Date     06/05/2024    K 3.9 06/05/2024    CALCIUM 9.6 06/05/2024    AST 16 06/05/2024    ALT 9 06/05/2024    BUN 9 06/05/2024    CREATININE 0.60 06/05/2024    CREATININE 0.52 (L) 07/17/2023    CREATININE 0.61 09/26/2022    EGFRIFNONA 112 07/28/2021    EGFRIFAFRI 136 07/28/2021       Lab Results   Component Value Date    HGBA1C 5.40 07/17/2023    HGBA1C 5.40 09/26/2022    HGBA1C 5.3 05/13/2019    TSH 1.590 06/05/2024       Lab Results   Component Value Date     (H) 06/05/2024    HDL 85 (H) 06/05/2024    TRIG 92 06/05/2024       Lab Results   Component Value Date    OFLP38TI 21.3 (L) 06/05/2024        Lab Results   Component Value Date    WBC 4.33 06/05/2024    HGB 13.7 06/05/2024    MCV 93.8 06/05/2024     06/05/2024       Lab Results   Component Value Date    GLUCOSEU NEGATIVE 05/13/2019    BLOODU NEGATIVE 05/13/2019    NITRITEU NEGATIVE 05/13/2019    LEUKOCYTESUR NEGATIVE 05/13/2019          Lab Results   Component Value Date    HEPCVIRUSABY Non-Reactive 09/26/2022           ASSESSMENT & PLAN      Diagnoses and all orders for this visit:    1. Follow-up exam (Primary)    2. Vitamin D deficiency    3. Mixed hyperlipidemia    4. Encounter for colonoscopy due to history of colonic polyp  -     Ambulatory Referral For Screening Colonoscopy    5. Poor eating habits  -     Hemoglobin A1c    Other orders  -     triamcinolone (KENALOG) 0.1 % cream; Apply 1 Application topically to the appropriate area as directed 2 (Two) Times a Day.  Dispense: 45 g; Refill: 1  -     topiramate (TOPAMAX) 25 MG tablet; Take 1 tablet by mouth Every Night.  Dispense: 30 tablet; Refill: 1      Labs were completed prior to her arrival, labs did show worsening in her lipid panel, I did start patient recently on cholesterol medication.  Labs also showed a decrease in vitamin D and patient was started on a once daily vitamin D supplement.  She does have a history of multiple colon polyps with colonoscopy that was done in 2020.  It does recommend 3 to 5-year follow-up patient would like to go ahead and schedule for the colonoscopy.  Patient wants well woman exam, but will not be able to do it until after July 19 due to insurance purposes, patient needs to be at least a year out from previous Pap before we can repeat Pap.      Patient states she is addicted to sugar, she will eats in a container of ice cream daily, she states that she has chocolate multiple times a day, and admits that she has very poor dietary habits.  She does worried that she will become diabetic if she does not change her eating habits.  We had a long discussion with the patient about the addiction of sugar, ways to help wean herself off of sugar will decrease her addiction.  Patient states she will follow-up in July to get Pap and at that time we will reassess and feel patient is doing.  Patient was given a packet about addiction to sugar and how to wean off of the sugary products.  Will get Toprol made to see if it was 80 and the taste causing her avoidance of  wanting to eat sugar.  Patient verbalized understanding and is agreeable with current treatment plan.    Will send over triamcinolone cream to aid in the insect bites and help with the itching.  Discussed return precautions.  Patient verbalized understanding.

## 2024-06-07 NOTE — PROGRESS NOTES
Chief Complaint  No chief complaint on file.    Subjective        Medical History: has a past medical history of Allergy (1994), GERD (gastroesophageal reflux disease) (01/14/2020), Helicobacter pylori infection (01/14/2020), Hepatitis A (1996), Migraine, Ovarian cyst, TMJ syndrome (1999), Upset stomach, and Vertigo (02/17/2020).     Surgical History: has a past surgical history that includes Temporomandibular joint surgery (1999).     Family History: family history includes Colon cancer in her maternal grandmother and mother; Heart disease in her father, maternal grandmother, and other family members; Stroke in her father; Uterine cancer in an other family member.     Social History: reports that she has never smoked. She has never used smokeless tobacco. She reports that she does not drink alcohol and does not use drugs.    Marce Moses presents to [facility]  Hyperlipidemia  This is a chronic problem. The current episode started more than 1 year ago. The problem is controlled. There are no known factors aggravating her hyperlipidemia. Pertinent negatives include no leg pain or myalgias. Current antihyperlipidemic treatment includes statins. The current treatment provides significant improvement of lipids. There are no compliance problems.  Risk factors for coronary artery disease include post-menopausal.       Objective   Vital Signs:   There were no vitals taken for this visit.      Wt Readings from Last 3 Encounters:   02/02/24 52.9 kg (116 lb 11.2 oz)   07/19/23 53.7 kg (118 lb 6.4 oz)   09/26/22 52.6 kg (116 lb)        BP Readings from Last 3 Encounters:   02/02/24 100/60   07/19/23 98/70   09/26/22 118/78        BMI is within normal parameters. No other follow-up for BMI required.       Physical Exam  Vitals reviewed.   Constitutional:       Appearance: Normal appearance. She is well-developed.   HENT:      Head: Normocephalic and atraumatic.   Eyes:      Conjunctiva/sclera:  Conjunctivae normal.      Pupils: Pupils are equal, round, and reactive to light.   Cardiovascular:      Rate and Rhythm: Normal rate and regular rhythm.      Heart sounds: No murmur heard.  Pulmonary:      Effort: Pulmonary effort is normal.      Breath sounds: Normal breath sounds. No wheezing.   Skin:     General: Skin is warm and dry.   Neurological:      Mental Status: She is alert and oriented to person, place, and time.   Psychiatric:         Mood and Affect: Mood and affect normal.         Behavior: Behavior normal.         Thought Content: Thought content normal.         Judgment: Judgment normal.        Result Review :  {The following data was reviewed by JONAS Caba on 06/07/2024.  Common labs          7/17/2023    09:09 6/5/2024    12:00   Common Labs   Glucose 86  89    BUN 12  9    Creatinine 0.52  0.60    Sodium 141  141    Potassium 3.5  3.9    Chloride 104  104    Calcium 9.2  9.6    Albumin 4.1  4.4    Total Bilirubin 0.3  0.4    Alkaline Phosphatase 60  74    AST (SGOT) 15  16    ALT (SGPT) 9  9    WBC 4.32  4.33    Hemoglobin 12.5  13.7    Hematocrit 37.8  42.7    Platelets 248  253    Total Cholesterol 211  242    Triglycerides 73  92    HDL Cholesterol 73  85    LDL Cholesterol  125  141    Hemoglobin A1C 5.40       Renal Profile          7/17/2023    09:09 6/5/2024    12:00   Renal Profile   BUN 12  9    Creatinine 0.52  0.60      Data reviewed : Previous office note             Current Outpatient Medications on File Prior to Visit   Medication Sig Dispense Refill    atorvastatin (LIPITOR) 10 MG tablet Take 1 tablet by mouth Daily. 90 tablet 1    azelaic acid (AZELEX) 15 % gel       calcium carbonate (OS-KRUNAL) 1250 (500 Ca) MG tablet Take 1 tablet by mouth Daily. 90 tablet 1    calcium carbonate, oyster shell, 500 MG tablet tablet       desonide (DESOWEN) 0.05 % cream APPLY TOPICALLY TO THE AFFECTED AREA TWICE DAILY AS NEEDED FOR ITCHY SKIN      doxycycline (VIBRAMYCIN) 100 MG  capsule       rizatriptan MLT (Maxalt-MLT) 10 MG disintegrating tablet Place 1 tablet on the tongue 1 (One) Time As Needed for Migraine. May repeat in 2 hours if needed 27 tablet 1    Tranexamic Acid 650 MG tablet       vitamin D (ERGOCALCIFEROL) 1.25 MG (43004 UT) capsule capsule Take 1 capsule by mouth 1 (One) Time Per Week. 13 capsule 1    Vitamin D, Cholecalciferol, 25 MCG (1000 UT) capsule Take 1 capsule by mouth Daily. 90 capsule 1     No current facility-administered medications on file prior to visit.        Assessment and Plan  Diagnoses and all orders for this visit:    1. Follow-up exam (Primary)    2. Vitamin D deficiency    3. Mixed hyperlipidemia      {Time Spent (Optional):45009}  Follow Up   No follow-ups on file.  Patient was given instructions and counseling regarding her condition or for health maintenance advice. Please see specific information pulled into the AVS if appropriate.       Part of this note may be electronic transcription/translation of spoken language to printed text using the Dragon dictation system

## 2024-07-25 ENCOUNTER — PROCEDURE VISIT (OUTPATIENT)
Dept: FAMILY MEDICINE CLINIC | Facility: CLINIC | Age: 54
End: 2024-07-25
Payer: OTHER GOVERNMENT

## 2024-07-25 VITALS
BODY MASS INDEX: 21.2 KG/M2 | OXYGEN SATURATION: 98 % | SYSTOLIC BLOOD PRESSURE: 102 MMHG | HEART RATE: 79 BPM | DIASTOLIC BLOOD PRESSURE: 68 MMHG | HEIGHT: 62 IN | WEIGHT: 115.2 LBS | TEMPERATURE: 97.7 F

## 2024-07-25 DIAGNOSIS — Z01.419 WELL WOMAN EXAM WITH ROUTINE GYNECOLOGICAL EXAM: Primary | ICD-10-CM

## 2024-07-25 DIAGNOSIS — Z78.9 DIET IS HIGH IN SUGAR: ICD-10-CM

## 2024-07-25 DIAGNOSIS — E63.9 POOR EATING HABITS: ICD-10-CM

## 2024-07-25 PROCEDURE — 99396 PREV VISIT EST AGE 40-64: CPT | Performed by: NURSE PRACTITIONER

## 2024-07-25 PROCEDURE — 87624 HPV HI-RISK TYP POOLED RSLT: CPT | Performed by: NURSE PRACTITIONER

## 2024-07-25 PROCEDURE — G0123 SCREEN CERV/VAG THIN LAYER: HCPCS | Performed by: NURSE PRACTITIONER

## 2024-07-25 RX ORDER — NALTREXONE HYDROCHLORIDE 50 MG/1
25 TABLET, FILM COATED ORAL DAILY
Qty: 15 TABLET | Refills: 1 | Status: SHIPPED | OUTPATIENT
Start: 2024-07-25

## 2024-07-25 RX ORDER — BUPROPION HYDROCHLORIDE 150 MG/1
150 TABLET, EXTENDED RELEASE ORAL 2 TIMES DAILY
Qty: 60 TABLET | Refills: 1 | Status: SHIPPED | OUTPATIENT
Start: 2024-07-25

## 2024-07-25 NOTE — PROGRESS NOTES
"  ENCOUNTER DATE:  07/25/2024    Marce Moses / 54 y.o. / female      CHIEF COMPLAINT     Gynecologic Exam      VITALS     Visit Vitals  /68   Pulse 79   Temp 97.7 °F (36.5 °C)   Ht 157.5 cm (62\")   Wt 52.3 kg (115 lb 3.2 oz)   LMP  (LMP Unknown)   SpO2 98%   Breastfeeding No   BMI 21.07 kg/m²       BP Readings from Last 3 Encounters:   07/25/24 102/68   06/07/24 126/86   02/02/24 100/60     Wt Readings from Last 3 Encounters:   07/25/24 52.3 kg (115 lb 3.2 oz)   06/07/24 52.6 kg (116 lb)   02/02/24 52.9 kg (116 lb 11.2 oz)      Body mass index is 21.07 kg/m².    MEDICATIONS     Current Outpatient Medications on File Prior to Visit   Medication Sig Dispense Refill    atorvastatin (LIPITOR) 10 MG tablet Take 1 tablet by mouth Daily. 90 tablet 1    azelaic acid (AZELEX) 15 % gel       calcium carbonate (OS-KRUNAL) 1250 (500 Ca) MG tablet Take 1 tablet by mouth Daily. 90 tablet 1    calcium carbonate, oyster shell, 500 MG tablet tablet       desonide (DESOWEN) 0.05 % cream APPLY TOPICALLY TO THE AFFECTED AREA TWICE DAILY AS NEEDED FOR ITCHY SKIN      doxycycline (VIBRAMYCIN) 100 MG capsule       rizatriptan MLT (Maxalt-MLT) 10 MG disintegrating tablet Place 1 tablet on the tongue 1 (One) Time As Needed for Migraine. May repeat in 2 hours if needed 27 tablet 1    Tranexamic Acid 650 MG tablet       triamcinolone (KENALOG) 0.1 % cream Apply 1 Application topically to the appropriate area as directed 2 (Two) Times a Day. 45 g 1    vitamin D (ERGOCALCIFEROL) 1.25 MG (64297 UT) capsule capsule Take 1 capsule by mouth 1 (One) Time Per Week. 13 capsule 1    Vitamin D, Cholecalciferol, 25 MCG (1000 UT) capsule Take 1 capsule by mouth Daily. 90 capsule 1    vitamin E 100 UNIT capsule Take 1 capsule by mouth Daily.      [DISCONTINUED] topiramate (TOPAMAX) 25 MG tablet Take 1 tablet by mouth Every Night. 30 tablet 1     No current facility-administered medications on file prior to visit.         HISTORY OF PRESENT ILLNESS "     Marce presents for annual health maintenance visit.  Lab Results   Component Value Date    HPVAPTIMA Negative 07/19/2023      Last PAP visit: approximately 1 year ago  General health: good  Lifestyle:  Attempting to lose weight?: No   Diet: has not been eating as healthy  Reproductive health:  Sexually active?: Yes   Sexual problems?: No problems  Concern for STD?: No    Sees Gynecologist?: No   Arlette/Postmenopausal?: Yes   Domestic abuse concerns: No   Depression Screening:            Patient Care Team:  Adolfo Singh APRN as PCP - General (Nurse Practitioner)      ALLERGIES  Allergies   Allergen Reactions    Shellfish-Derived Products Other (See Comments)     Chest pain        PFSH:     The following portions of the patient's history were reviewed and updated as appropriate: Allergies / Current Medications / Past Medical History / Surgical History / Social History / Family History    PROBLEM LIST   Patient Active Problem List   Diagnosis    GERD (gastroesophageal reflux disease)    Helicobacter pylori gastrointestinal tract infection    Hepatitis A    Migraine    Ovarian cyst    Cervical disc herniation    TMJ syndrome    Jaw pain    Bilateral temporomandibular joint pain    Skin lesions, generalized       PAST MEDICAL HISTORY  Past Medical History:   Diagnosis Date    Allergy 1994    GERD (gastroesophageal reflux disease) 01/14/2020    Helicobacter pylori infection 01/14/2020    Hepatitis A 1996    Migraine     Ovarian cyst     TMJ syndrome 1999    Upset stomach     Vertigo 02/17/2020       SURGICAL HISTORY  Past Surgical History:   Procedure Laterality Date    TEMPOROMANDIBULAR JOINT SURGERY  1999       SOCIAL HISTORY  Social History     Socioeconomic History    Marital status:    Tobacco Use    Smoking status: Never    Smokeless tobacco: Never   Vaping Use    Vaping status: Never Used   Substance and Sexual Activity    Alcohol use: Never    Drug use: Never    Sexual activity: Yes        FAMILY HISTORY  Family History   Problem Relation Age of Onset    Colon cancer Mother     Stroke Father     Heart disease Father     Colon cancer Maternal Grandmother     Heart disease Maternal Grandmother     Heart disease Other     Uterine cancer Other     Heart disease Other        IMMUNIZATION HISTORY  Immunization History   Administered Date(s) Administered    COVID-19 (MODERNA) 1st,2nd,3rd Dose Monovalent 02/05/2021, 03/04/2021    COVID-19 (MODERNA) Monovalent Original Booster 02/05/2021, 03/04/2021    Influenza, Unspecified 10/01/2019    PPD Test 02/02/2024         Gynecologic Exam  She is sexually active. No, her partner does not have an STD. Contraceptive use: Postmenopausal. She is postmenopausal. There is no history of an ectopic pregnancy, metrorrhagia, ovarian cysts or an STD.     Patient states she is addicted to sugar, she will eats in a container of ice cream daily, she states that she has chocolate multiple times a day, and admits that she has very poor dietary habits. She does worried that she will become diabetic if she does not change her eating habits. We had a long discussion with the patient about the addiction of sugar, ways to help wean herself off of sugar will decrease her addiction. Patient states she will follow-up in July to get Pap and at that time we will reassess and feel patient is doing. Patient was given a packet about addiction to sugar and how to wean off of the sugary products. Will get Toprol made to see if it will aid in avoidance of wanting to eat sugar. Patient verbalized understanding and is agreeable with current treatment plan.     Patient comes in for follow-up, she states that she feels like she is eating more sugar now than she was previously.  She states that Topamax has not helped her want to stop eating sugar.  She did have an elevated A1c at last visit, after long discussion decided to try patient on Contrave with Wellbutrin and naltrexone, patient to  follow-up in 2 months to see if its helped with the avoidance of sugar, also encourage patient to be an appointment with a counselor to help with the addiction itself.  Patient verbalized understanding.    Physical exam  Physical Exam  Vitals reviewed. Exam conducted with a chaperone present (Yuly Carrasquillo MA).   Constitutional:       Appearance: Normal appearance. She is well-developed.   HENT:      Head: Normocephalic and atraumatic.   Eyes:      Conjunctiva/sclera: Conjunctivae normal.      Pupils: Pupils are equal, round, and reactive to light.   Cardiovascular:      Rate and Rhythm: Normal rate and regular rhythm.      Heart sounds: No murmur heard.  Pulmonary:      Effort: Pulmonary effort is normal.      Breath sounds: Normal breath sounds. No wheezing.   Chest:   Breasts:     Right: Normal. No mass, nipple discharge or skin change.      Left: Normal. No mass, nipple discharge or skin change.      Comments: Up-to-date on mammogram  Genitourinary:     Exam position: Knee-chest position.      Vagina: Normal.      Cervix: Dilated. Friability present.      Uterus: Normal.    Skin:     General: Skin is warm and dry.   Neurological:      Mental Status: She is alert and oriented to person, place, and time.   Psychiatric:         Mood and Affect: Mood and affect normal.         Behavior: Behavior normal.         Thought Content: Thought content normal.         Judgment: Judgment normal.       REVIEWED DATA      Labs:    Lab Results   Component Value Date     06/05/2024    K 3.9 06/05/2024    CALCIUM 9.6 06/05/2024    AST 16 06/05/2024    ALT 9 06/05/2024    BUN 9 06/05/2024    CREATININE 0.60 06/05/2024    CREATININE 0.52 (L) 07/17/2023    CREATININE 0.61 09/26/2022    EGFRIFNONA 112 07/28/2021    EGFRIFAFRI 136 07/28/2021       Lab Results   Component Value Date    HGBA1C 5.70 (H) 06/07/2024    HGBA1C 5.40 07/17/2023    HGBA1C 5.40 09/26/2022    TSH 1.590 06/05/2024       Lab Results   Component Value Date      (H) 06/05/2024    HDL 85 (H) 06/05/2024    TRIG 92 06/05/2024       Lab Results   Component Value Date    EEZS22RT 21.3 (L) 06/05/2024        Lab Results   Component Value Date    WBC 4.33 06/05/2024    HGB 13.7 06/05/2024    MCV 93.8 06/05/2024     06/05/2024       Lab Results   Component Value Date    GLUCOSEU NEGATIVE 05/13/2019    BLOODU NEGATIVE 05/13/2019    NITRITEU NEGATIVE 05/13/2019    LEUKOCYTESUR NEGATIVE 05/13/2019          Lab Results   Component Value Date    HEPCVIRUSABY Non-Reactive 09/26/2022           ASSESSMENT & PLAN     Diagnoses and all orders for this visit:    1. Well woman exam with routine gynecological exam (Primary)  -     IgP, Aptima HPV    2. Poor eating habits    3. Diet is high in sugar    Other orders  -     buPROPion SR (Wellbutrin SR) 150 MG 12 hr tablet; Take 1 tablet by mouth 2 (Two) Times a Day.  Dispense: 60 tablet; Refill: 1  -     naltrexone (DEPADE) 50 MG tablet; Take 0.5 tablets by mouth Daily.  Dispense: 15 tablet; Refill: 1      Will do trial of the Wellbutrin and naltrexone to help with the sugar addiction and have patient follow-up in 2 months, discussed return precautions, patient verbalized understanding agreeable treatment plan.  HEALTHCARE MAINTENANCE ISSUES     Cancer Screening:    Breast: Recommended monthly self exams; annual professional exam  Mammogram: every 1 year  Cervical: 2 years  Skin: Monthly self skin examination, annual exam by health professional      Lifestyle Counseling:  Lifestyle Modifications: Discussed sexual issues, safe sex practices, contraception  Safety Issues: Always wear seatbelt, Avoid texting while driving   Use sunscreen, regular skin examination  Recommended annual dental/vision examination.  Emotional/Stress/Sleep: Reviewed and  given when appropriate    Part of this note may be electronic transcription/translation of spoken language to printed text using the Dragon dictation system

## 2024-07-30 LAB
CYTOLOGIST CVX/VAG CYTO: NORMAL
CYTOLOGY CVX/VAG DOC CYTO: NORMAL
CYTOLOGY CVX/VAG DOC THIN PREP: NORMAL
DX ICD CODE: NORMAL
HPV I/H RISK 4 DNA CVX QL PROBE+SIG AMP: NEGATIVE
Lab: NORMAL
OTHER STN SPEC: NORMAL
STAT OF ADQ CVX/VAG CYTO-IMP: NORMAL

## 2024-08-12 ENCOUNTER — TELEPHONE (OUTPATIENT)
Dept: FAMILY MEDICINE CLINIC | Facility: CLINIC | Age: 54
End: 2024-08-12
Payer: OTHER GOVERNMENT

## 2024-08-12 ENCOUNTER — APPOINTMENT (OUTPATIENT)
Dept: GENERAL RADIOLOGY | Facility: HOSPITAL | Age: 54
End: 2024-08-12
Payer: OTHER GOVERNMENT

## 2024-08-12 ENCOUNTER — HOSPITAL ENCOUNTER (EMERGENCY)
Facility: HOSPITAL | Age: 54
Discharge: HOME | End: 2024-08-12
Attending: EMERGENCY MEDICINE
Payer: OTHER GOVERNMENT

## 2024-08-12 VITALS
RESPIRATION RATE: 18 BRPM | SYSTOLIC BLOOD PRESSURE: 114 MMHG | HEART RATE: 91 BPM | OXYGEN SATURATION: 91 % | TEMPERATURE: 100 F | HEIGHT: 62 IN | WEIGHT: 116.84 LBS | BODY MASS INDEX: 21.5 KG/M2 | DIASTOLIC BLOOD PRESSURE: 68 MMHG

## 2024-08-12 DIAGNOSIS — M79.10 MYALGIA: Primary | ICD-10-CM

## 2024-08-12 LAB
ALBUMIN SERPL-MCNC: 4.2 G/DL (ref 3.5–5.2)
ALBUMIN/GLOB SERPL: 1.2 G/DL
ALP SERPL-CCNC: 70 U/L (ref 39–117)
ALT SERPL W P-5'-P-CCNC: 10 U/L (ref 1–33)
ANION GAP SERPL CALCULATED.3IONS-SCNC: 13.4 MMOL/L (ref 5–15)
AST SERPL-CCNC: 14 U/L (ref 1–32)
B PARAPERT DNA SPEC QL NAA+PROBE: NOT DETECTED
B PERT DNA SPEC QL NAA+PROBE: NOT DETECTED
BACTERIA UR QL AUTO: ABNORMAL /HPF
BASOPHILS # BLD AUTO: 0.02 10*3/MM3 (ref 0–0.2)
BASOPHILS NFR BLD AUTO: 0.2 % (ref 0–1.5)
BILIRUB SERPL-MCNC: 0.5 MG/DL (ref 0–1.2)
BILIRUB UR QL STRIP: NEGATIVE
BUN SERPL-MCNC: 8 MG/DL (ref 6–20)
BUN/CREAT SERPL: 11.3 (ref 7–25)
C PNEUM DNA NPH QL NAA+NON-PROBE: NOT DETECTED
CALCIUM SPEC-SCNC: 9.4 MG/DL (ref 8.6–10.5)
CHLORIDE SERPL-SCNC: 102 MMOL/L (ref 98–107)
CLARITY UR: CLEAR
CO2 SERPL-SCNC: 24.6 MMOL/L (ref 22–29)
COLOR UR: YELLOW
CREAT SERPL-MCNC: 0.71 MG/DL (ref 0.57–1)
D-LACTATE SERPL-SCNC: 1.3 MMOL/L (ref 0.5–2)
DEPRECATED RDW RBC AUTO: 43.5 FL (ref 37–54)
EGFRCR SERPLBLD CKD-EPI 2021: 101.2 ML/MIN/1.73
EOSINOPHIL # BLD AUTO: 0 10*3/MM3 (ref 0–0.4)
EOSINOPHIL NFR BLD AUTO: 0 % (ref 0.3–6.2)
ERYTHROCYTE [DISTWIDTH] IN BLOOD BY AUTOMATED COUNT: 12.4 % (ref 12.3–15.4)
FLUAV SUBTYP SPEC NAA+PROBE: NOT DETECTED
FLUAV SUBTYP SPEC NAA+PROBE: NOT DETECTED
FLUBV RNA ISLT QL NAA+PROBE: NOT DETECTED
FLUBV RNA ISLT QL NAA+PROBE: NOT DETECTED
GEN 5 2HR TROPONIN T REFLEX: <6 NG/L
GLOBULIN UR ELPH-MCNC: 3.4 GM/DL
GLUCOSE SERPL-MCNC: 100 MG/DL (ref 65–99)
GLUCOSE UR STRIP-MCNC: NEGATIVE MG/DL
HADV DNA SPEC NAA+PROBE: NOT DETECTED
HCG INTACT+B SERPL-ACNC: <0.5 MIU/ML
HCOV 229E RNA SPEC QL NAA+PROBE: NOT DETECTED
HCOV HKU1 RNA SPEC QL NAA+PROBE: NOT DETECTED
HCOV NL63 RNA SPEC QL NAA+PROBE: NOT DETECTED
HCOV OC43 RNA SPEC QL NAA+PROBE: NOT DETECTED
HCT VFR BLD AUTO: 40.8 % (ref 34–46.6)
HGB BLD-MCNC: 13 G/DL (ref 12–15.9)
HGB UR QL STRIP.AUTO: ABNORMAL
HMPV RNA NPH QL NAA+NON-PROBE: NOT DETECTED
HOLD SPECIMEN: NORMAL
HOLD SPECIMEN: NORMAL
HPIV1 RNA ISLT QL NAA+PROBE: NOT DETECTED
HPIV2 RNA SPEC QL NAA+PROBE: NOT DETECTED
HPIV3 RNA NPH QL NAA+PROBE: NOT DETECTED
HPIV4 P GENE NPH QL NAA+PROBE: NOT DETECTED
HYALINE CASTS UR QL AUTO: ABNORMAL /LPF
IMM GRANULOCYTES # BLD AUTO: 0.03 10*3/MM3 (ref 0–0.05)
IMM GRANULOCYTES NFR BLD AUTO: 0.3 % (ref 0–0.5)
KETONES UR QL STRIP: ABNORMAL
LEUKOCYTE ESTERASE UR QL STRIP.AUTO: ABNORMAL
LIPASE SERPL-CCNC: 35 U/L (ref 13–60)
LYMPHOCYTES # BLD AUTO: 0.21 10*3/MM3 (ref 0.7–3.1)
LYMPHOCYTES NFR BLD AUTO: 2.4 % (ref 19.6–45.3)
M PNEUMO IGG SER IA-ACNC: NOT DETECTED
MCH RBC QN AUTO: 30.4 PG (ref 26.6–33)
MCHC RBC AUTO-ENTMCNC: 31.9 G/DL (ref 31.5–35.7)
MCV RBC AUTO: 95.6 FL (ref 79–97)
MONOCYTES # BLD AUTO: 0.28 10*3/MM3 (ref 0.1–0.9)
MONOCYTES NFR BLD AUTO: 3.1 % (ref 5–12)
NEUTROPHILS NFR BLD AUTO: 8.38 10*3/MM3 (ref 1.7–7)
NEUTROPHILS NFR BLD AUTO: 94 % (ref 42.7–76)
NITRITE UR QL STRIP: NEGATIVE
NRBC BLD AUTO-RTO: 0 /100 WBC (ref 0–0.2)
PH UR STRIP.AUTO: 8 [PH] (ref 5–8)
PLATELET # BLD AUTO: 236 10*3/MM3 (ref 140–450)
PMV BLD AUTO: 9 FL (ref 6–12)
POTASSIUM SERPL-SCNC: 3.6 MMOL/L (ref 3.5–5.2)
PROT SERPL-MCNC: 7.6 G/DL (ref 6–8.5)
PROT UR QL STRIP: NEGATIVE
RBC # BLD AUTO: 4.27 10*6/MM3 (ref 3.77–5.28)
RBC # UR STRIP: ABNORMAL /HPF
REF LAB TEST METHOD: ABNORMAL
RHINOVIRUS RNA SPEC NAA+PROBE: NOT DETECTED
RSV RNA NPH QL NAA+NON-PROBE: NOT DETECTED
RSV RNA NPH QL NAA+NON-PROBE: NOT DETECTED
SARS-COV-2 RNA RESP QL NAA+PROBE: NOT DETECTED
SARS-COV-2 RNA RESP QL NAA+PROBE: NOT DETECTED
SODIUM SERPL-SCNC: 140 MMOL/L (ref 136–145)
SP GR UR STRIP: 1.02 (ref 1–1.03)
SQUAMOUS #/AREA URNS HPF: ABNORMAL /HPF
TROPONIN T DELTA: NORMAL
TROPONIN T SERPL HS-MCNC: <6 NG/L
UROBILINOGEN UR QL STRIP: ABNORMAL
WBC # UR STRIP: ABNORMAL /HPF
WBC NRBC COR # BLD AUTO: 8.92 10*3/MM3 (ref 3.4–10.8)
WHOLE BLOOD HOLD COAG: NORMAL
WHOLE BLOOD HOLD SPECIMEN: NORMAL

## 2024-08-12 PROCEDURE — 93005 ELECTROCARDIOGRAM TRACING: CPT | Performed by: EMERGENCY MEDICINE

## 2024-08-12 PROCEDURE — 87637 SARSCOV2&INF A&B&RSV AMP PRB: CPT

## 2024-08-12 PROCEDURE — 93005 ELECTROCARDIOGRAM TRACING: CPT

## 2024-08-12 PROCEDURE — 96374 THER/PROPH/DIAG INJ IV PUSH: CPT

## 2024-08-12 PROCEDURE — 84702 CHORIONIC GONADOTROPIN TEST: CPT

## 2024-08-12 PROCEDURE — 83605 ASSAY OF LACTIC ACID: CPT

## 2024-08-12 PROCEDURE — 80053 COMPREHEN METABOLIC PANEL: CPT

## 2024-08-12 PROCEDURE — 36415 COLL VENOUS BLD VENIPUNCTURE: CPT

## 2024-08-12 PROCEDURE — 81001 URINALYSIS AUTO W/SCOPE: CPT

## 2024-08-12 PROCEDURE — 71045 X-RAY EXAM CHEST 1 VIEW: CPT

## 2024-08-12 PROCEDURE — 85025 COMPLETE CBC W/AUTO DIFF WBC: CPT

## 2024-08-12 PROCEDURE — 84484 ASSAY OF TROPONIN QUANT: CPT

## 2024-08-12 PROCEDURE — 83690 ASSAY OF LIPASE: CPT

## 2024-08-12 PROCEDURE — 93010 ELECTROCARDIOGRAM REPORT: CPT | Performed by: INTERNAL MEDICINE

## 2024-08-12 PROCEDURE — 87040 BLOOD CULTURE FOR BACTERIA: CPT

## 2024-08-12 PROCEDURE — 99284 EMERGENCY DEPT VISIT MOD MDM: CPT

## 2024-08-12 PROCEDURE — 25810000003 LACTATED RINGERS SOLUTION: Performed by: EMERGENCY MEDICINE

## 2024-08-12 PROCEDURE — 25010000002 KETOROLAC TROMETHAMINE PER 15 MG: Performed by: EMERGENCY MEDICINE

## 2024-08-12 PROCEDURE — 0202U NFCT DS 22 TRGT SARS-COV-2: CPT | Performed by: EMERGENCY MEDICINE

## 2024-08-12 RX ORDER — KETOROLAC TROMETHAMINE 30 MG/ML
30 INJECTION, SOLUTION INTRAMUSCULAR; INTRAVENOUS ONCE
Status: COMPLETED | OUTPATIENT
Start: 2024-08-12 | End: 2024-08-12

## 2024-08-12 RX ORDER — SODIUM CHLORIDE 0.9 % (FLUSH) 0.9 %
10 SYRINGE (ML) INJECTION AS NEEDED
Status: DISCONTINUED | OUTPATIENT
Start: 2024-08-12 | End: 2024-08-12 | Stop reason: HOSPADM

## 2024-08-12 RX ADMIN — KETOROLAC TROMETHAMINE 30 MG: 30 INJECTION, SOLUTION INTRAMUSCULAR; INTRAVENOUS at 14:46

## 2024-08-12 RX ADMIN — SODIUM CHLORIDE, POTASSIUM CHLORIDE, SODIUM LACTATE AND CALCIUM CHLORIDE 1000 ML: 600; 310; 30; 20 INJECTION, SOLUTION INTRAVENOUS at 14:46

## 2024-08-12 NOTE — ED PROVIDER NOTES
"Time: 5:46 PM EDT  Date of encounter:  8/12/2024  Independent Historian/Clinical History and Information was obtained by:   Patient and   Chief Complaint: Generalized body aches    History is limited by: N/A    History of Present Illness:  Patient is a 54 y.o. year old female who presents to the emergency department for evaluation of generalized bodyaches.  Patient states \"my whole body hurts\".  Patient reports that she woke up about 230 this morning and felt ill.  Patient went on to work but had to come home because if she is feeling sick.  Patient states that her whole body is aching including her muscles and joints.  Patient reports having a headache as well.  Patient is to having chills and nausea but no vomiting or diarrhea.  Patient denies any cough or shortness of breath.  Patient denies any neck pain or stiffness.    HPI    Patient Care Team  Primary Care Provider: Adolfo Signh APRN    Past Medical History:     Allergies   Allergen Reactions    Shellfish-Derived Products Other (See Comments)     Chest pain     Past Medical History:   Diagnosis Date    Allergy 1994    GERD (gastroesophageal reflux disease) 01/14/2020    Helicobacter pylori infection 01/14/2020    Hepatitis A 1996    Migraine     Ovarian cyst     TMJ syndrome 1999    Upset stomach     Vertigo 02/17/2020     Past Surgical History:   Procedure Laterality Date    TEMPOROMANDIBULAR JOINT SURGERY  1999     Family History   Problem Relation Age of Onset    Colon cancer Mother     Stroke Father     Heart disease Father     Colon cancer Maternal Grandmother     Heart disease Maternal Grandmother     Heart disease Other     Uterine cancer Other     Heart disease Other        Home Medications:  Prior to Admission medications    Medication Sig Start Date End Date Taking? Authorizing Provider   atorvastatin (LIPITOR) 10 MG tablet Take 1 tablet by mouth Daily. 6/6/24   Adolfo Singh APRN   azelaic acid (AZELEX) 15 % gel  11/30/23  "  ProviderDamon MD   buPROPion SR (Wellbutrin SR) 150 MG 12 hr tablet Take 1 tablet by mouth 2 (Two) Times a Day. 7/25/24   Adolfo Singh APRN   calcium carbonate (OS-KRNUAL) 1250 (500 Ca) MG tablet Take 1 tablet by mouth Daily. 7/19/23   Adolfo Singh APRN   calcium carbonate, oyster shell, 500 MG tablet tablet  1/16/24   Damon Alvarez MD   desonide (DESOWEN) 0.05 % cream APPLY TOPICALLY TO THE AFFECTED AREA TWICE DAILY AS NEEDED FOR ITCHY SKIN 10/11/23   Damon Alvarez MD   doxycycline (VIBRAMYCIN) 100 MG capsule  1/22/24   Damon Alvarez MD   naltrexone (DEPADE) 50 MG tablet Take 0.5 tablets by mouth Daily. 7/25/24   Adolfo Singh APRN   rizatriptan MLT (Maxalt-MLT) 10 MG disintegrating tablet Place 1 tablet on the tongue 1 (One) Time As Needed for Migraine. May repeat in 2 hours if needed 7/19/23   Adolfo Singh APRN   Tranexamic Acid 650 MG tablet  1/15/24   Damon Alvarez MD   triamcinolone (KENALOG) 0.1 % cream Apply 1 Application topically to the appropriate area as directed 2 (Two) Times a Day. 6/7/24   Adolfo Singh APRN   vitamin D (ERGOCALCIFEROL) 1.25 MG (59024 UT) capsule capsule Take 1 capsule by mouth 1 (One) Time Per Week. 6/6/24   Adolfo Singh APRN   Vitamin D, Cholecalciferol, 25 MCG (1000 UT) capsule Take 1 capsule by mouth Daily. 7/19/23   Adolfo Singh APRN   vitamin E 100 UNIT capsule Take 1 capsule by mouth Daily.    ProviderDamon MD        Social History:   Social History     Tobacco Use    Smoking status: Never    Smokeless tobacco: Never   Vaping Use    Vaping status: Never Used   Substance Use Topics    Alcohol use: Never    Drug use: Never         Review of Systems:  Review of Systems   Constitutional:  Positive for chills. Negative for fever.   HENT:  Negative for congestion, ear pain and sore throat.    Eyes:  Negative for pain.   Respiratory:  Negative for cough, chest tightness  "and shortness of breath.    Cardiovascular:  Negative for chest pain.   Gastrointestinal:  Positive for nausea. Negative for abdominal pain, diarrhea and vomiting.   Genitourinary:  Negative for flank pain and hematuria.   Musculoskeletal:  Positive for myalgias. Negative for joint swelling.   Skin:  Negative for pallor.   Neurological:  Positive for headaches. Negative for seizures.   All other systems reviewed and are negative.       Physical Exam:  /70 (BP Location: Left arm, Patient Position: Lying)   Pulse 91   Temp 100 °F (37.8 °C) (Oral)   Resp 18   Ht 157.5 cm (62\")   Wt 53 kg (116 lb 13.5 oz)   LMP  (LMP Unknown)   SpO2 95%   BMI 21.37 kg/m²     Physical Exam    Vital signs were reviewed under triage note.  General appearance - Patient appears well-developed and well-nourished.  Patient is in no acute distress.  Patient is ill-appearing.  Head - Normocephalic, atraumatic.  Pupils - Equal, round, reactive to light.  Extraocular muscles are intact.  Conjunctiva is clear.  Nasal - Normal inspection.  No evidence of trauma or epistaxis.  Tympanic membranes - Gray, intact without erythema or retractions.  Oral mucosa - Pink and moist without lesions or erythema.  Uvula is midline.  Chest wall - Atraumatic.  Chest wall is nontender.  There are no vesicular rashes noted.  Neck - Supple.  Trachea was midline.  There is no palpable lymphadenopathy or thyromegaly.  There are no meningeal signs  Lungs - Clear to auscultation and percussion bilaterally.  Heart - Regular rate and rhythm without any murmurs, clicks, or gallops.  Abdomen - Soft.  Bowel sounds are present.  There is no palpable tenderness.  There is no rebound, guarding, or rigidity.  There are no palpable masses.  There are no pulsatile masses.  Back - Spine is straight and midline.  There is no CVA tenderness.  Extremities - Intact x4 with full range of motion.  There is no palpable edema.  Pulses are intact x4 and equal.  Neurologic - " Patient is awake, alert, and oriented x3.  Cranial nerves II through XII are grossly intact.  Motor and sensory functions grossly intact.  Cerebellar function was normal.  Integument - There are no rashes.  There are no petechia or purpura lesions noted.  There are no vesicular lesions noted.           Procedures:  Procedures      Medical Decision Making:      Comorbidities that affect care:    GERD, H. pylori, migraines, vertigo    External Notes reviewed:    Previous Clinic Note: Office visit with Dr. Singh on 6/7/2024 was reviewed by me.      The following orders were placed and all results were independently analyzed by me:  Orders Placed This Encounter   Procedures    Blood Culture - Blood,    Blood Culture - Blood,    COVID PRE-OP / PRE-PROCEDURE SCREENING ORDER (NO ISOLATION) - Swab, Nasopharynx    COVID-19, FLU A/B, RSV PCR 1 HR TAT - Swab, Nasopharynx    Respiratory Panel PCR w/COVID-19(SARS-CoV-2) [facility]/ADYLIN/SONDRA/[facility]ARD In-House, NP Swab in UTM/VTM, 2 HR TAT - Swab, Nasopharynx    XR Chest 1 View    Comprehensive Metabolic Panel    Lactic Acid, Plasma    High Sensitivity Troponin T    CBC Auto Differential    [place] Draw    Lipase    Urinalysis With Microscopic If Indicated (No Culture) - Urine, Clean Catch    hCG, Quantitative, Pregnancy    High Sensitivity Troponin T 2Hr    Urinalysis, Microscopic Only - Urine, Clean Catch    NPO Diet NPO Type: Strict NPO    Undress & Gown    Continuous Pulse Oximetry    Vital Signs    Undress & Gown    Oxygen Therapy- Nasal Cannula; Titrate 1-6 LPM Per SpO2; 90 - 95%    ECG 12 Lead Dyspnea    Insert Peripheral IV    Insert Peripheral IV    CBC & Differential    Green Top (Gel)    Lavender Top    Gold Top - SST    Light Blue Top       Medications Given in the Emergency Department:  Medications   sodium chloride 0.9 % flush 10 mL (has no administration in time range)   sodium chloride 0.9 % flush 10 mL (has no administration in time range)   lactated ringers bolus 1,000  mL (1,000 mL Intravenous New Bag 8/12/24 1446)   ketorolac (TORADOL) injection 30 mg (30 mg Intravenous Given 8/12/24 1446)        ED Course:    ED Course as of 08/13/24 1518   Tue Aug 13, 2024   1517 EKG performed at 1035 was interpreted by me showing normal sinus rhythm with a ventricular rate of 88 bpm.  The LA interval is 150 ms.  P waves are normal.  QRS interval is normal.  Axis is a 21 degrees.  There is no acute ischemic ST or T wave changes identified.  QT corrected was 393 ms. [TB]      ED Course User Index  [TB] Madi Cervantes DO   The patient was seen and evaluated the ED by me.  The above history and physical examination was performed as documented.  Diagnostic data was obtained.  Results reviewed.  Findings were discussed with the patient.  Patient reports she is feeling much better after ED treatment.  Patient now reports that she started a new medication to help her avoid her sugar/carbohydrate addiction.  Patient is her time she takes that she feels bad afterwards.  Advised her to stop taking this medication so she can talk to her PCP in case this is more of a adverse effect of her medication.  Otherwise patient seems to have more of a viral syndrome.  Patient either way stable for discharge home with outpatient follow-up.    Labs:    Lab Results (last 24 hours)       Procedure Component Value Units Date/Time    CBC & Differential [[id number]]  (Abnormal) Collected: 08/12/24 1039    Specimen: Blood from Arm, Left Updated: 08/12/24 1051    Narrative:      The following orders were created for panel order CBC & Differential.  Procedure                               Abnormality         Status                     ---------                               -----------         ------                     CBC Auto Differential[[id number]]        Abnormal            Final result                 Please view results for these tests on the individual orders.    Comprehensive Metabolic Panel [[id number]]  (Abnormal)  Collected: 08/12/24 1039    Specimen: Blood from Arm, Left Updated: 08/12/24 1113     Glucose 100 mg/dL      BUN 8 mg/dL      Creatinine 0.71 mg/dL      Sodium 140 mmol/L      Potassium 3.6 mmol/L      Chloride 102 mmol/L      CO2 24.6 mmol/L      Calcium 9.4 mg/dL      Total Protein 7.6 g/dL      Albumin 4.2 g/dL      ALT (SGPT) 10 U/L      AST (SGOT) 14 U/L      Alkaline Phosphatase 70 U/L      Total Bilirubin 0.5 mg/dL      Globulin 3.4 gm/dL      A/G Ratio 1.2 g/dL      BUN/Creatinine Ratio 11.3     Anion Gap 13.4 mmol/L      eGFR 101.2 mL/min/1.73     Narrative:      GFR Normal >60  Chronic Kidney Disease <60  Kidney Failure <15      Lactic Acid, Plasma [[id number]]  (Normal) Collected: 08/12/24 1039    Specimen: Blood from Arm, Left Updated: 08/12/24 1108     Lactate 1.3 mmol/L     Blood Culture - Blood, Arm, Left [[id number]] Collected: 08/12/24 1039    Specimen: Blood from Arm, Left Updated: 08/12/24 1047    High Sensitivity Troponin T [[id number]]  (Normal) Collected: 08/12/24 1039    Specimen: Blood from Arm, Left Updated: 08/12/24 1113     HS Troponin T <6 ng/L     Narrative:      High Sensitive Troponin T Reference Range:  <14.0 ng/L- Negative Female for AMI  <22.0 ng/L- Negative Male for AMI  >=14 - Abnormal Female indicating possible myocardial injury.  >=22 - Abnormal Male indicating possible myocardial injury.   Clinicians would have to utilize clinical acumen, EKG, Troponin, and serial changes to determine if it is an Acute Myocardial Infarction or myocardial injury due to an underlying chronic condition.         CBC Auto Differential [[id number]]  (Abnormal) Collected: 08/12/24 1039    Specimen: Blood from Arm, Left Updated: 08/12/24 1051     WBC 8.92 10*3/mm3      RBC 4.27 10*6/mm3      Hemoglobin 13.0 g/dL      Hematocrit 40.8 %      MCV 95.6 fL      MCH 30.4 pg      MCHC 31.9 g/dL      RDW 12.4 %      RDW-SD 43.5 fl      MPV 9.0 fL      Platelets 236 10*3/mm3      Neutrophil % 94.0 %      Lymphocyte  % 2.4 %      Monocyte % 3.1 %      Eosinophil % 0.0 %      Basophil % 0.2 %      Immature Grans % 0.3 %      Neutrophils, Absolute 8.38 10*3/mm3      Lymphocytes, Absolute 0.21 10*3/mm3      Monocytes, Absolute 0.28 10*3/mm3      Eosinophils, Absolute 0.00 10*3/mm3      Basophils, Absolute 0.02 10*3/mm3      Immature Grans, Absolute 0.03 10*3/mm3      nRBC 0.0 /100 WBC     Lipase [[id number]]  (Normal) Collected: 08/12/24 1039    Specimen: Blood from Arm, Left Updated: 08/12/24 1113     Lipase 35 U/L     hCG, Quantitative, Pregnancy [[id number]] Collected: 08/12/24 1039    Specimen: Blood from Arm, Left Updated: 08/12/24 1109     HCG Quantitative <0.50 mIU/mL     Narrative:      HCG Ranges by Gestational Age    Females - non-pregnant premenopausal   </= 1mIU/mL HCG  Females - postmenopausal               </= 7mIU/mL HCG    3 Weeks       5.4   -      72 mIU/mL  4 Weeks      10.2   -     708 mIU/mL  5 Weeks       217   -   8,245 mIU/mL  6 Weeks       152   -  32,177 mIU/mL  7 Weeks     4,059   - 153,767 mIU/mL  8 Weeks    31,366   - 149,094 mIU/mL  9 Weeks    59,109   - 135,901 mIU/mL  10 Weeks   44,186   - 170,409 mIU/mL  12 Weeks   27,107   - 201,615 mIU/mL  14 Weeks   24,302   -  93,646 mIU/mL  15 Weeks   12,540   -  69,747 mIU/mL  16 Weeks    8,904   -  55,332 mIU/mL  17 Weeks    8,240   -  51,793 mIU/mL  18 Weeks    9,649   -  55,271 mIU/mL      Urinalysis With Microscopic If Indicated (No Culture) - Urine, Clean Catch [[id number]]  (Abnormal) Collected: 08/12/24 1142    Specimen: Urine, Clean Catch Updated: 08/12/24 1208     Color, UA Yellow     Appearance, UA Clear     pH, UA 8.0     Specific Gravity, UA 1.022     Glucose, UA Negative     Ketones, UA 80 mg/dL (3+)     Bilirubin, UA Negative     Blood, UA Small (1+)     Protein, UA Negative     Leuk Esterase, UA Trace     Nitrite, UA Negative     Urobilinogen, UA 1.0 E.U./dL    Urinalysis, Microscopic Only - Urine, Clean Catch [[id number]]  (Abnormal) Collected:  08/12/24 1142    Specimen: Urine, Clean Catch Updated: 08/12/24 1208     RBC, UA 11-20 /HPF      WBC, UA 3-5 /HPF      Bacteria, UA None Seen /HPF      Squamous Epithelial Cells, UA 0-2 /HPF      Hyaline Casts, UA 3-6 /LPF      Methodology Automated Microscopy    Blood Culture - Blood, Arm, Left [[id number]] Collected: 08/12/24 1145    Specimen: Blood from Arm, Left Updated: 08/12/24 1156    High Sensitivity Troponin T 2Hr [[id number]] Collected: 08/12/24 1145    Specimen: Blood Updated: 08/12/24 1223     HS Troponin T <6 ng/L      Troponin T Delta --     Comment: Unable to calculate.       Narrative:      High Sensitive Troponin T Reference Range:  <14.0 ng/L- Negative Female for AMI  <22.0 ng/L- Negative Male for AMI  >=14 - Abnormal Female indicating possible myocardial injury.  >=22 - Abnormal Male indicating possible myocardial injury.   Clinicians would have to utilize clinical acumen, EKG, Troponin, and serial changes to determine if it is an Acute Myocardial Infarction or myocardial injury due to an underlying chronic condition.         COVID PRE-OP / PRE-PROCEDURE SCREENING ORDER (NO ISOLATION) - Swab, Nasopharynx [[id number]]  (Normal) Collected: 08/12/24 1145    Specimen: Swab from Nasopharynx Updated: 08/12/24 1311    Narrative:      The following orders were created for panel order COVID PRE-OP / PRE-PROCEDURE SCREENING ORDER (NO ISOLATION) - Swab, Nasopharynx.  Procedure                               Abnormality         Status                     ---------                               -----------         ------                     COVID-19, FLU A/B, RSV P...[[id number]]  Normal              Final result                 Please view results for these tests on the individual orders.    COVID-19, FLU A/B, RSV PCR 1 HR TAT - Swab, Nasopharynx [[id number]]  (Normal) Collected: 08/12/24 1145    Specimen: Swab from Nasopharynx Updated: 08/12/24 1311     COVID19 Not Detected     Influenza A PCR Not Detected      Influenza B PCR Not Detected     RSV, PCR Not Detected    Narrative:      Fact sheet for providers: https://www.fda.gov/media/887682/download    Fact sheet for patients: https://www.fda.gov/media/931337/download    Test performed by PCR.    Respiratory Panel PCR w/COVID-19(SARS-CoV-2) [facility]/DAY[facility]/SONDRA/[facility] In-House, NP Swab in UTM/VTM, 2 HR TAT - Swab, Nasopharynx [[id number]]  (Normal) Collected: 08/12/24 1447    Specimen: Swab from Nasopharynx Updated: 08/12/24 1549     ADENOVIRUS, PCR Not Detected     Coronavirus 229E Not Detected     Coronavirus HKU1 Not Detected     Coronavirus NL63 Not Detected     Coronavirus OC43 Not Detected     COVID19 Not Detected     Human Metapneumovirus Not Detected     Human Rhinovirus/Enterovirus Not Detected     Influenza A PCR Not Detected     Influenza B PCR Not Detected     Parainfluenza Virus 1 Not Detected     Parainfluenza Virus 2 Not Detected     Parainfluenza Virus 3 Not Detected     Parainfluenza Virus 4 Not Detected     RSV, PCR Not Detected     Bordetella pertussis pcr Not Detected     Bordetella parapertussis PCR Not Detected     Chlamydophila pneumoniae PCR Not Detected     Mycoplasma pneumo by PCR Not Detected    Narrative:      In the setting of a positive respiratory panel with a viral infection PLUS a negative procalcitonin without other underlying concern for bacterial infection, consider observing off antibiotics or discontinuation of antibiotics and continue supportive care. If the respiratory panel is positive for atypical bacterial infection (Bordetella pertussis, Chlamydophila pneumoniae, or Mycoplasma pneumoniae), consider antibiotic de-escalation to target atypical bacterial infection.             Imaging:    XR Chest 1 View    Result Date: 8/12/2024  XR CHEST 1 VW Date of Exam: 8/12/2024 3:00 PM EDT Indication: Shortness of air and fever Comparison: None available. Findings: The heart and mediastinal contours are within normal limits. Lungs are grossly  clear. Osseous structures are unremarkable.     Impression: No acute process Electronically Signed: Juan Jose Sebastian MD  8/12/2024 3:23 PM EDT  Workstation ID: [id number]       Differential Diagnosis and Discussion:    Myalgia: Differential diagnosis includes but is not limited to muscle overuse or strain, infections, inflammatory conditions, autoimmune diseases, medications, metabolic disorders, fibromyalgia, vascular disorders, neurological conditions, psychological factors, toxins, and muscle disorders.  Weakness: Based on the patient's history, signs, and symptoms, the diffential diagnosis includes but is not limited to meningitis, stroke, sepsis, subarachnoid hemorrhage, intracranial bleeding, encephalitis, acute uti, dehydration, MS, myasthenia gravis, Guillan Cyril, migraine variant, neuromuscular disorders vertigo, electrolyte imbalance, and metabolic disorders.    All labs were reviewed and interpreted by me.  All X-rays impressions were independently interpreted by me.  EKG was interpreted by me.    MDM     Amount and/or Complexity of Data Reviewed  Clinical lab tests: reviewed  Tests in the radiology section of CPT®: reviewed  Tests in the medicine section of CPT®: reviewed             Patient Care Considerations:    ANTIBIOTICS: I considered prescribing antibiotics as an outpatient however no bacterial focus of infection was found.      Consultants/Shared Management Plan:    None    Social Determinants of Health:    Patient is independent, reliable, and has access to care.       Disposition and Care Coordination:    Discharged: I considered escalation of care by admitting this patient to the hospital, however there were no acute findings to warrant inpatient admission.    I have explained the patient´s condition, diagnoses and treatment plan based on the information available to me at this time. I have answered questions and addressed any concerns. The patient has a good  understanding of the patient´s  diagnosis, condition, and treatment plan as can be expected at this point. The vital signs have been stable. The patient´s condition is stable and appropriate for discharge from the emergency department.      The patient will pursue further outpatient evaluation with the primary care physician or other designated or consulting physician as outlined in the discharge instructions. They are agreeable to this plan of care and follow-up instructions have been explained in detail. The patient has received these instructions in written format and has expressed an understanding of the discharge instructions. The patient is aware that any significant change in condition or worsening of symptoms should prompt an immediate return to this or the closest emergency department or call to 911.    Final diagnoses:   Myalgia        ED Disposition       ED Disposition   Discharge    Condition   Stable    Comment   --               This medical record created using voice recognition software.             Madi Cervantes DO  08/13/24 [id number]

## 2024-08-12 NOTE — TELEPHONE ENCOUNTER
Left message for pt to return call in regards to Hx Colonic Polyps, pt requesting colonoscopy, but time frame is inconvenient for insurance and insurance will not cover it.

## 2024-08-12 NOTE — DISCHARGE INSTRUCTIONS
Rest at home.  Drink plenty fluids.  Take Tylenol and/or ibuprofen for pain.  Take the Zofran as needed for nausea and vomiting.  Follow-up with your primary care provider in 1 week if symptoms have not resolved.  Return to the ER for fever greater than 101, increasing shortness of breath, or any other concerns issues that may arise.

## 2024-08-13 ENCOUNTER — HOSPITAL ENCOUNTER (EMERGENCY)
Facility: HOSPITAL | Age: 54
Discharge: HOME | End: 2024-08-13
Attending: EMERGENCY MEDICINE
Payer: OTHER GOVERNMENT

## 2024-08-13 ENCOUNTER — APPOINTMENT (OUTPATIENT)
Dept: CT IMAGING | Facility: HOSPITAL | Age: 54
End: 2024-08-13
Payer: OTHER GOVERNMENT

## 2024-08-13 VITALS
HEIGHT: 62 IN | WEIGHT: 116.18 LBS | DIASTOLIC BLOOD PRESSURE: 70 MMHG | TEMPERATURE: 99.3 F | OXYGEN SATURATION: 98 % | BODY MASS INDEX: 21.38 KG/M2 | HEART RATE: 84 BPM | SYSTOLIC BLOOD PRESSURE: 110 MMHG | RESPIRATION RATE: 18 BRPM

## 2024-08-13 DIAGNOSIS — K52.9 COLITIS: Primary | ICD-10-CM

## 2024-08-13 LAB
ALBUMIN SERPL-MCNC: 3.8 G/DL (ref 3.5–5.2)
ALBUMIN/GLOB SERPL: 1.1 G/DL
ALP SERPL-CCNC: 62 U/L (ref 39–117)
ALT SERPL W P-5'-P-CCNC: 5 U/L (ref 1–33)
ANION GAP SERPL CALCULATED.3IONS-SCNC: 12 MMOL/L (ref 5–15)
AST SERPL-CCNC: 13 U/L (ref 1–32)
BASOPHILS # BLD AUTO: 0.02 10*3/MM3 (ref 0–0.2)
BASOPHILS NFR BLD AUTO: 0.3 % (ref 0–1.5)
BILIRUB SERPL-MCNC: 0.3 MG/DL (ref 0–1.2)
BUN SERPL-MCNC: 14 MG/DL (ref 6–20)
BUN/CREAT SERPL: 21.2 (ref 7–25)
CALCIUM SPEC-SCNC: 8.6 MG/DL (ref 8.6–10.5)
CHLORIDE SERPL-SCNC: 97 MMOL/L (ref 98–107)
CO2 SERPL-SCNC: 23 MMOL/L (ref 22–29)
CREAT SERPL-MCNC: 0.66 MG/DL (ref 0.57–1)
DEPRECATED RDW RBC AUTO: 43.2 FL (ref 37–54)
EGFRCR SERPLBLD CKD-EPI 2021: 104.4 ML/MIN/1.73
EOSINOPHIL # BLD AUTO: 0.18 10*3/MM3 (ref 0–0.4)
EOSINOPHIL NFR BLD AUTO: 2.8 % (ref 0.3–6.2)
ERYTHROCYTE [DISTWIDTH] IN BLOOD BY AUTOMATED COUNT: 12.7 % (ref 12.3–15.4)
GLOBULIN UR ELPH-MCNC: 3.4 GM/DL
GLUCOSE SERPL-MCNC: 122 MG/DL (ref 65–99)
HCT VFR BLD AUTO: 39.2 % (ref 34–46.6)
HGB BLD-MCNC: 12.7 G/DL (ref 12–15.9)
HOLD SPECIMEN: NORMAL
HOLD SPECIMEN: NORMAL
IMM GRANULOCYTES # BLD AUTO: 0.02 10*3/MM3 (ref 0–0.05)
IMM GRANULOCYTES NFR BLD AUTO: 0.3 % (ref 0–0.5)
LIPASE SERPL-CCNC: 29 U/L (ref 13–60)
LYMPHOCYTES # BLD AUTO: 0.33 10*3/MM3 (ref 0.7–3.1)
LYMPHOCYTES NFR BLD AUTO: 5.2 % (ref 19.6–45.3)
MCH RBC QN AUTO: 30 PG (ref 26.6–33)
MCHC RBC AUTO-ENTMCNC: 32.4 G/DL (ref 31.5–35.7)
MCV RBC AUTO: 92.7 FL (ref 79–97)
MONOCYTES # BLD AUTO: 0.23 10*3/MM3 (ref 0.1–0.9)
MONOCYTES NFR BLD AUTO: 3.6 % (ref 5–12)
NEUTROPHILS NFR BLD AUTO: 5.55 10*3/MM3 (ref 1.7–7)
NEUTROPHILS NFR BLD AUTO: 87.8 % (ref 42.7–76)
NRBC BLD AUTO-RTO: 0 /100 WBC (ref 0–0.2)
PLATELET # BLD AUTO: 202 10*3/MM3 (ref 140–450)
PMV BLD AUTO: 9 FL (ref 6–12)
POTASSIUM SERPL-SCNC: 3.1 MMOL/L (ref 3.5–5.2)
PROT SERPL-MCNC: 7.2 G/DL (ref 6–8.5)
QT INTERVAL: 324 MS
QTC INTERVAL: 393 MS
RBC # BLD AUTO: 4.23 10*6/MM3 (ref 3.77–5.28)
SODIUM SERPL-SCNC: 132 MMOL/L (ref 136–145)
WBC NRBC COR # BLD AUTO: 6.33 10*3/MM3 (ref 3.4–10.8)
WHOLE BLOOD HOLD COAG: NORMAL
WHOLE BLOOD HOLD SPECIMEN: NORMAL

## 2024-08-13 PROCEDURE — 25810000003 SODIUM CHLORIDE 0.9 % SOLUTION: Performed by: EMERGENCY MEDICINE

## 2024-08-13 PROCEDURE — 25010000002 ONDANSETRON PER 1 MG: Performed by: EMERGENCY MEDICINE

## 2024-08-13 PROCEDURE — 74177 CT ABD & PELVIS W/CONTRAST: CPT

## 2024-08-13 PROCEDURE — 99285 EMERGENCY DEPT VISIT HI MDM: CPT

## 2024-08-13 PROCEDURE — 25510000001 IOPAMIDOL PER 1 ML: Performed by: EMERGENCY MEDICINE

## 2024-08-13 PROCEDURE — 80053 COMPREHEN METABOLIC PANEL: CPT | Performed by: EMERGENCY MEDICINE

## 2024-08-13 PROCEDURE — 85025 COMPLETE CBC W/AUTO DIFF WBC: CPT | Performed by: EMERGENCY MEDICINE

## 2024-08-13 PROCEDURE — 96374 THER/PROPH/DIAG INJ IV PUSH: CPT

## 2024-08-13 PROCEDURE — 83690 ASSAY OF LIPASE: CPT | Performed by: EMERGENCY MEDICINE

## 2024-08-13 RX ORDER — METRONIDAZOLE 500 MG/1
500 TABLET ORAL 3 TIMES DAILY
Qty: 21 TABLET | Refills: 0 | Status: SHIPPED | OUTPATIENT
Start: 2024-08-13 | End: 2024-08-13

## 2024-08-13 RX ORDER — DICYCLOMINE HCL 20 MG
20 TABLET ORAL EVERY 6 HOURS
Qty: 20 TABLET | Refills: 0 | Status: SHIPPED | OUTPATIENT
Start: 2024-08-13

## 2024-08-13 RX ORDER — ONDANSETRON 4 MG/1
4 TABLET, ORALLY DISINTEGRATING ORAL EVERY 8 HOURS PRN
Qty: 15 TABLET | Refills: 0 | Status: SHIPPED | OUTPATIENT
Start: 2024-08-13

## 2024-08-13 RX ORDER — METRONIDAZOLE 500 MG/1
500 TABLET ORAL 3 TIMES DAILY
Qty: 21 TABLET | Refills: 0 | Status: SHIPPED | OUTPATIENT
Start: 2024-08-13

## 2024-08-13 RX ORDER — DICYCLOMINE HCL 20 MG
20 TABLET ORAL EVERY 6 HOURS
Qty: 20 TABLET | Refills: 0 | Status: SHIPPED | OUTPATIENT
Start: 2024-08-13 | End: 2024-08-13

## 2024-08-13 RX ORDER — FAMOTIDINE 20 MG/1
20 TABLET, FILM COATED ORAL 2 TIMES DAILY
Qty: 30 TABLET | Refills: 0 | Status: SHIPPED | OUTPATIENT
Start: 2024-08-13

## 2024-08-13 RX ORDER — ONDANSETRON 2 MG/ML
4 INJECTION INTRAMUSCULAR; INTRAVENOUS ONCE
Status: COMPLETED | OUTPATIENT
Start: 2024-08-13 | End: 2024-08-13

## 2024-08-13 RX ORDER — CIPROFLOXACIN 500 MG/1
500 TABLET, FILM COATED ORAL EVERY 12 HOURS
Qty: 20 TABLET | Refills: 0 | Status: SHIPPED | OUTPATIENT
Start: 2024-08-13 | End: 2024-08-13

## 2024-08-13 RX ORDER — CIPROFLOXACIN 500 MG/1
500 TABLET, FILM COATED ORAL EVERY 12 HOURS
Qty: 20 TABLET | Refills: 0 | Status: SHIPPED | OUTPATIENT
Start: 2024-08-13

## 2024-08-13 RX ORDER — FAMOTIDINE 20 MG/1
20 TABLET, FILM COATED ORAL 2 TIMES DAILY
Qty: 30 TABLET | Refills: 0 | Status: SHIPPED | OUTPATIENT
Start: 2024-08-13 | End: 2024-08-13

## 2024-08-13 RX ORDER — ACETAMINOPHEN 325 MG/1
650 TABLET ORAL ONCE
Status: COMPLETED | OUTPATIENT
Start: 2024-08-13 | End: 2024-08-13

## 2024-08-13 RX ORDER — ONDANSETRON 4 MG/1
4 TABLET, ORALLY DISINTEGRATING ORAL EVERY 8 HOURS PRN
Qty: 15 TABLET | Refills: 0 | Status: SHIPPED | OUTPATIENT
Start: 2024-08-13 | End: 2024-08-13

## 2024-08-13 RX ADMIN — ONDANSETRON 4 MG: 2 INJECTION INTRAMUSCULAR; INTRAVENOUS at 11:48

## 2024-08-13 RX ADMIN — IOPAMIDOL 75 ML: 755 INJECTION, SOLUTION INTRAVENOUS at 14:07

## 2024-08-13 RX ADMIN — ACETAMINOPHEN 650 MG: 325 TABLET ORAL at 13:42

## 2024-08-13 RX ADMIN — SODIUM CHLORIDE 1000 ML: 9 INJECTION, SOLUTION INTRAVENOUS at 11:47

## 2024-08-13 NOTE — ED PROVIDER NOTES
Time: 2:53 PM EDT  Date of encounter:  8/13/2024  Independent Historian/Clinical History and Information was obtained by:   Patient and Family    History is limited by: N/A    Chief Complaint: Abdominal upset      History of Present Illness:  Patient is a 54 y.o. year old female who presents to the emergency department for evaluation of abdominal pain, vomiting, and diarrhea after eating a raw eggs several days ago.  Patient has had fever and chills.  Patient denies chest pain and shortness of breath.    HPI    Patient Care Team  Primary Care Provider: Adolfo Singh APRN    Past Medical History:     Allergies   Allergen Reactions    Shellfish-Derived Products Other (See Comments)     Chest pain     Past Medical History:   Diagnosis Date    Allergy 1994    GERD (gastroesophageal reflux disease) 01/14/2020    Helicobacter pylori infection 01/14/2020    Hepatitis A 1996    Migraine     Ovarian cyst     TMJ syndrome 1999    Upset stomach     Vertigo 02/17/2020     Past Surgical History:   Procedure Laterality Date    TEMPOROMANDIBULAR JOINT SURGERY  1999     Family History   Problem Relation Age of Onset    Colon cancer Mother     Stroke Father     Heart disease Father     Colon cancer Maternal Grandmother     Heart disease Maternal Grandmother     Heart disease Other     Uterine cancer Other     Heart disease Other        Home Medications:  Prior to Admission medications    Medication Sig Start Date End Date Taking? Authorizing Provider   atorvastatin (LIPITOR) 10 MG tablet Take 1 tablet by mouth Daily. 6/6/24   Adolfo Singh APRN   azelaic acid (AZELEX) 15 % gel  11/30/23   Provider, MD Damon   buPROPion SR (Wellbutrin SR) 150 MG 12 hr tablet Take 1 tablet by mouth 2 (Two) Times a Day. 7/25/24   Adolfo Singh APRN   calcium carbonate (OS-KRUNAL) 1250 (500 Ca) MG tablet Take 1 tablet by mouth Daily. 7/19/23   Adolfo Singh APRN   calcium carbonate, oyster shell, 500 MG tablet  tablet  1/16/24   Damon Alvarez MD   desonide (DESOWEN) 0.05 % cream APPLY TOPICALLY TO THE AFFECTED AREA TWICE DAILY AS NEEDED FOR ITCHY SKIN 10/11/23   Damon Alvarez MD   doxycycline (VIBRAMYCIN) 100 MG capsule  1/22/24   Damon Alvarez MD   naltrexone (DEPADE) 50 MG tablet Take 0.5 tablets by mouth Daily. 7/25/24   Adolfo Singh APRN   rizatriptan MLT (Maxalt-MLT) 10 MG disintegrating tablet Place 1 tablet on the tongue 1 (One) Time As Needed for Migraine. May repeat in 2 hours if needed 7/19/23   Adolfo Singh APRN   Tranexamic Acid 650 MG tablet  1/15/24   Damon Alvarez MD   triamcinolone (KENALOG) 0.1 % cream Apply 1 Application topically to the appropriate area as directed 2 (Two) Times a Day. 6/7/24   Adolfo Singh APRN   vitamin D (ERGOCALCIFEROL) 1.25 MG (52384 UT) capsule capsule Take 1 capsule by mouth 1 (One) Time Per Week. 6/6/24   Adolfo Singh APRN   Vitamin D, Cholecalciferol, 25 MCG (1000 UT) capsule Take 1 capsule by mouth Daily. 7/19/23   Adolfo Singh APRN   vitamin E 100 UNIT capsule Take 1 capsule by mouth Daily.    Damon Alvarez MD        Social History:   Social History     Tobacco Use    Smoking status: Never    Smokeless tobacco: Never   Vaping Use    Vaping status: Never Used   Substance Use Topics    Alcohol use: Never    Drug use: Never         Review of Systems:  Review of Systems   Constitutional:  Negative for chills and fever.   HENT:  Negative for congestion, rhinorrhea and sore throat.    Eyes:  Negative for pain and visual disturbance.   Respiratory:  Negative for apnea, cough, chest tightness and shortness of breath.    Cardiovascular:  Negative for chest pain and palpitations.   Gastrointestinal:  Positive for abdominal pain, diarrhea, nausea and vomiting.   Genitourinary:  Negative for difficulty urinating and dysuria.   Musculoskeletal:  Negative for joint swelling and myalgias.   Skin:   "Negative for color change.   Neurological:  Negative for seizures and headaches.   Psychiatric/Behavioral: Negative.     All other systems reviewed and are negative.       Physical Exam:  /72 (BP Location: Left arm, Patient Position: Lying)   Pulse 89   Temp 99.1 °F (37.3 °C) (Oral)   Resp 18   Ht 157.5 cm (62\")   Wt 52.7 kg (116 lb 2.9 oz)   LMP  (LMP Unknown)   SpO2 97%   BMI 21.25 kg/m²     Physical Exam  Vitals and nursing note reviewed.   Constitutional:       General: She is not in acute distress.     Appearance: Normal appearance. She is not toxic-appearing.   HENT:      Head: Normocephalic and atraumatic.      Jaw: There is normal jaw occlusion.   Eyes:      General: Lids are normal.      Extraocular Movements: Extraocular movements intact.      Conjunctiva/sclera: Conjunctivae normal.      Pupils: Pupils are equal, round, and reactive to light.   Cardiovascular:      Rate and Rhythm: Normal rate and regular rhythm.      Pulses: Normal pulses.      Heart sounds: Normal heart sounds.   Pulmonary:      Effort: Pulmonary effort is normal. No respiratory distress.      Breath sounds: Normal breath sounds. No wheezing or rhonchi.   Abdominal:      General: Abdomen is flat.      Palpations: Abdomen is soft.      Tenderness: There is no abdominal tenderness. There is no guarding or rebound.   Musculoskeletal:         General: Normal range of motion.      Cervical back: Normal range of motion and neck supple.      Right lower leg: No edema.      Left lower leg: No edema.   Skin:     General: Skin is warm and dry.   Neurological:      Mental Status: She is alert and oriented to person, place, and time. Mental status is at baseline.   Psychiatric:         Mood and Affect: Mood normal.                  Procedures:  Procedures      Medical Decision Making:      Comorbidities that affect care:    None    External Notes reviewed:    Previous ED Note: Patient was seen in the emergency department for similar " symptoms yesterday.      The following orders were placed and all results were independently analyzed by me:  Orders Placed This Encounter   Procedures    CT Abdomen Pelvis With Contrast    [facility] Draw    Comprehensive Metabolic Panel    Lipase    CBC Auto Differential    Insert Peripheral IV    CBC & Differential    Green Top (Gel)    Lavender Top    Gold Top - SST    Light Blue Top       Medications Given in the Emergency Department:  Medications   sodium chloride 0.9 % bolus 1,000 mL (0 mL Intravenous Stopped 8/13/24 1217)   ondansetron (ZOFRAN) injection 4 mg (4 mg Intravenous Given 8/13/24 1148)   acetaminophen (TYLENOL) tablet 650 mg (650 mg Oral Given 8/13/24 1342)   iopamidol (ISOVUE-370) 76 % injection 100 mL (75 mL Intravenous Given 8/13/24 1407)        ED Course:         Labs:    Lab Results (last 24 hours)       Procedure Component Value Units Date/Time    CBC & Differential [[id number]]  (Abnormal) Collected: 08/13/24 1145    Specimen: Blood Updated: 08/13/24 1154    Narrative:      The following orders were created for panel order CBC & Differential.  Procedure                               Abnormality         Status                     ---------                               -----------         ------                     CBC Auto Differential[[id number]]        Abnormal            Final result                 Please view results for these tests on the individual orders.    Comprehensive Metabolic Panel [[id number]]  (Abnormal) Collected: 08/13/24 1145    Specimen: Blood Updated: 08/13/24 1218     Glucose 122 mg/dL      BUN 14 mg/dL      Creatinine 0.66 mg/dL      Sodium 132 mmol/L      Potassium 3.1 mmol/L      Chloride 97 mmol/L      CO2 23.0 mmol/L      Calcium 8.6 mg/dL      Total Protein 7.2 g/dL      Albumin 3.8 g/dL      ALT (SGPT) 5 U/L      AST (SGOT) 13 U/L      Alkaline Phosphatase 62 U/L      Total Bilirubin 0.3 mg/dL      Globulin 3.4 gm/dL      A/G Ratio 1.1 g/dL      BUN/Creatinine Ratio  21.2     Anion Gap 12.0 mmol/L      eGFR 104.4 mL/min/1.73     Narrative:      GFR Normal >60  Chronic Kidney Disease <60  Kidney Failure <15      Lipase [[id number]]  (Normal) Collected: 08/13/24 1145    Specimen: Blood Updated: 08/13/24 1214     Lipase 29 U/L     CBC Auto Differential [[id number]]  (Abnormal) Collected: 08/13/24 1145    Specimen: Blood Updated: 08/13/24 1154     WBC 6.33 10*3/mm3      RBC 4.23 10*6/mm3      Hemoglobin 12.7 g/dL      Hematocrit 39.2 %      MCV 92.7 fL      MCH 30.0 pg      MCHC 32.4 g/dL      RDW 12.7 %      RDW-SD 43.2 fl      MPV 9.0 fL      Platelets 202 10*3/mm3      Neutrophil % 87.8 %      Lymphocyte % 5.2 %      Monocyte % 3.6 %      Eosinophil % 2.8 %      Basophil % 0.3 %      Immature Grans % 0.3 %      Neutrophils, Absolute 5.55 10*3/mm3      Lymphocytes, Absolute 0.33 10*3/mm3      Monocytes, Absolute 0.23 10*3/mm3      Eosinophils, Absolute 0.18 10*3/mm3      Basophils, Absolute 0.02 10*3/mm3      Immature Grans, Absolute 0.02 10*3/mm3      nRBC 0.0 /100 WBC              Imaging:    CT Abdomen Pelvis With Contrast    Result Date: 8/13/2024  CT ABDOMEN PELVIS W CONTRAST Date of Exam: 8/13/2024 2:01 PM EDT Indication: Abdominal pain. Comparison: None available Technique: Axial CT images were obtained of the abdomen and pelvis after the uneventful intravenous administration of iodinated contrast. Reconstructed coronal and sagittal images were also obtained. Automated exposure control and iterative construction methods were used. Findings: The heart size is normal. There is no pericardial effusion. There is a 4 mm nodule within the lateral aspect of the right lower lobe best seen on image 8 of series 201. There is a 3 mm nodule within the left lower lobe best seen on image 4 of series 201. The liver is normal in size and contour. There is a 9 mm low-density lesion within the lateral right hepatic lobe the liver likely representing a small cyst. The gallbladder is present  without wall thickening. There is no intrahepatic or extrahepatic biliary ductal dilatation. The spleen is normal in size. The adrenal glands and pancreas appear within normal limits. The kidneys are symmetric in size and enhancement. There is no hydronephrosis or hydroureter. The urinary bladder is fluid-filled without wall thickening. The uterus is present and retroflexed. There is a 16 mm fibroid along the anterior uterine body. There our dilated parametrial vessels which can be seen with pelvic congestion syndrome in the correct clinical setting. The stomach and duodenum are normal in caliber and configuration. There are no abnormally dilated loops of small bowel to suggest small bowel obstruction. The appendix is not well visualized however there are no secondary signs of acute appendicitis. There is abnormal wall thickening involving the terminal ileum, cecum, and ascending colon. This can be seen with infectious or inflammatory ileitis and colitis. Crohn's disease could also have this appearance. There is a moderate to large colonic stool burden within the transverse and descending colon. The aorta is normal in caliber without evidence of aneurysm formation. The portal vein is patent. There are reactive right lower quadrant mesenteric lymph nodes. There is degenerative disc disease at L4-L5 and L5-S1.     Impression: 1. Inflammatory wall thickening involving the terminal ileum, cecum, and ascending colon. This could be secondary to infectious or inflammatory etiology. Crohn's disease could have this appearance. 2. Moderate colonic stool burden within the transverse and descending colon. 3. Small 9 mm simple cyst within the right hepatic lobe of the liver. 4. Small 4 mm right lower lobe pulmonary nodule. A follow-up chest CT in 1 year will be recommended if patient has risk factors such as smoking history. Electronically Signed: Keyon Vigil  8/13/2024 2:31 PM EDT  Workstation ID: [id number]    XR Chest 1  View    Result Date: 8/12/2024  XR CHEST 1 VW Date of Exam: 8/12/2024 3:00 PM EDT Indication: Shortness of air and fever Comparison: None available. Findings: The heart and mediastinal contours are within normal limits. Lungs are grossly clear. Osseous structures are unremarkable.     Impression: No acute process Electronically Signed: Juan Jose Sbeastian MD  8/12/2024 3:23 PM EDT  Workstation ID: [id number]       Differential Diagnosis and Discussion:    Diarrhea: Differential diagnosis includes but is not limited to malabsorption syndrome, bacterial infection, carcinoid syndrome, pancreatic hypersecretion, viral infection, celiac sprue, Crohn's disease, ulcerative colitis, ischemic colitis, colitis, hypermotility, and irritable bowel syndrome.    All labs were reviewed and interpreted by me.  CT scan radiology impression was interpreted by me.    MDM     The patient is resting comfortably and feels better, is alert and in no distress.  The patient´s CBC that was reviewed and interpreted by me shows no abnormalities of critical concern. Of note, there is no anemia requiring a blood transfusion and the platelet count is acceptable.  The patient´s CMP that was reviewed and interpretted by me shows no abnormalities of critical concern. Of note, the patient´s sodium and potassium are acceptable. The patient´s liver enzymes are unremarkable. The patient´s renal function (creatinine) is preserved. The patient has a normal anion gap.  CT scan of the abdomen pelvis is consistent with colitis.  Repeat examination is unremarkable and benign; in particular, there's no discomfort at McBurney's point and there is no pulsatile mass. The history, exam, diagnostic testing, and current condition does not suggest acute appendicitis, bowel obstruction, acute cholecystitis, bowel perforation, major gastrointestinal bleeding, severe diverticulitis, abdominal aortic aneurysm, mesenteric ischemia, volvulus, sepsis, or other significant  pathology that warrants further testing, continued ED treatment, admission, for surgical evaluation at this point. The vital signs have been stable. The patient does not have uncontrollable pain, intractable vomiting, or other significant symptoms. The patient's condition is stable and appropriate for discharge from the emergency department.          Patient Care Considerations:    None      Consultants/Shared Management Plan:    None    Social Determinants of Health:    Patient is independent, reliable, and has access to care.       Disposition and Care Coordination:    Discharged: I considered escalation of care by admitting this patient to the hospital, however no abscess is noted and the patient does report significant leaf of her symptoms with ED treatment.    I have explained the patient´s condition, diagnoses and treatment plan based on the information available to me at this time. I have answered questions and addressed any concerns. The patient has a good  understanding of the patient´s diagnosis, condition, and treatment plan as can be expected at this point. The vital signs have been stable. The patient´s condition is stable and appropriate for discharge from the emergency department.      The patient will pursue further outpatient evaluation with the primary care physician or other designated or consulting physician as outlined in the discharge instructions. They are agreeable to this plan of care and follow-up instructions have been explained in detail. The patient has received these instructions in written format and has expressed an understanding of the discharge instructions. The patient is aware that any significant change in condition or worsening of symptoms should prompt an immediate return to this or the closest emergency department or call to 911.  I have explained discharge medications and the need for follow up with the patient/caretakers. This was also printed in the discharge instructions.  Patient was discharged with the following medications and follow up:      Medication List        New Prescriptions      ciprofloxacin 500 MG tablet  Commonly known as: CIPRO  Take 1 tablet by mouth Every 12 (Twelve) Hours.     dicyclomine 20 MG tablet  Commonly known as: BENTYL  Take 1 tablet by mouth Every 6 (Six) Hours.     famotidine 20 MG tablet  Commonly known as: PEPCID  Take 1 tablet by mouth 2 (Two) Times a Day.     metroNIDAZOLE 500 MG tablet  Commonly known as: FLAGYL  Take 1 tablet by mouth 3 (Three) Times a Day.     ondansetron ODT 4 MG disintegrating tablet  Commonly known as: ZOFRAN-ODT  Place 1 tablet on the tongue Every 8 (Eight) Hours As Needed for Nausea or Vomiting.               Where to Get Your Medications        These medications were sent to [facility] - [address] [phone] [place] [id number] [address] [address]      Phone: [phone]   ciprofloxacin 500 MG tablet  dicyclomine 20 MG tablet  famotidine 20 MG tablet  metroNIDAZOLE 500 MG tablet  ondansetron ODT 4 MG disintegrating tablet      Adolfo Singh, APRN  [address]  [address]  [place] [id number]  [phone]    In 2 days         Final diagnoses:   Colitis        ED Disposition       ED Disposition   Discharge    Condition   Stable    Comment   --               This medical record created using voice recognition software.             Demetrio Krishnamurthy MD  08/13/24 5205

## 2024-08-15 ENCOUNTER — OFFICE VISIT (OUTPATIENT)
Dept: FAMILY MEDICINE CLINIC | Facility: CLINIC | Age: 54
End: 2024-08-15
Payer: OTHER GOVERNMENT

## 2024-08-15 VITALS
OXYGEN SATURATION: 100 % | DIASTOLIC BLOOD PRESSURE: 64 MMHG | TEMPERATURE: 98 F | BODY MASS INDEX: 20.68 KG/M2 | WEIGHT: 112.4 LBS | HEART RATE: 69 BPM | HEIGHT: 62 IN | SYSTOLIC BLOOD PRESSURE: 102 MMHG

## 2024-08-15 DIAGNOSIS — Z09 FOLLOW-UP EXAM: Primary | ICD-10-CM

## 2024-08-15 DIAGNOSIS — K52.9 COLITIS: ICD-10-CM

## 2024-08-15 DIAGNOSIS — R11.0 NAUSEA: ICD-10-CM

## 2024-08-15 DIAGNOSIS — R10.84 ABDOMINAL PAIN, GENERALIZED: ICD-10-CM

## 2024-08-15 NOTE — LETTER
August 15, 2024     Patient: Marce Moses   YOB: 1970   Date of Visit: 8/15/2024       To Whom It May Concern:    It is my medical opinion that Marce Moses please excuse from work 8/12/2024-8/19/2024          Sincerely,        JONAS Caba    CC: No Recipients

## 2024-08-15 NOTE — PROGRESS NOTES
"Patient states she is feeling some better.  She is kept a Chief Complaint  Abdominal Pain (ER F/U ), Headache, Shortness of Breath, and Fever    Subjective        Medical History: has a past medical history of Allergy (1994), GERD (gastroesophageal reflux disease) (01/14/2020), Helicobacter pylori infection (01/14/2020), Hepatitis A (1996), Migraine, Ovarian cyst, TMJ syndrome (1999), Upset stomach, and Vertigo (02/17/2020).     Surgical History: has a past surgical history that includes Temporomandibular joint surgery (1999).     Family History: family history includes Colon cancer in her maternal grandmother and mother; Heart disease in her father, maternal grandmother, and other family members; Stroke in her father; Uterine cancer in an other family member.     Social History: reports that she has never smoked. She has never used smokeless tobacco. She reports that she does not drink alcohol and does not use drugs.    Marce Moses presents to [facility]  History of Present Illness  Patient comes in for ER follow-up    Patient is a 54 y.o. year old female who presented to the emergency department for evaluation of generalized bodyaches.  Patient stated \"my whole body hurts\".  Patient reported that she woke up about 230 in the morning and felt ill.  Patient went on to work but had to come home because if she is feeling sick.  Patient states that her whole body is aching including her muscles and joints.  Patient reports having a headache as well.  Patient is to having chills and nausea but no vomiting or diarrhea.  Patient denies any cough or shortness of breath.  Patient denies any neck pain or stiffness.    Patient was diagnosed with colitis she was sent home with Cipro, Flagyl, pain medication, Pepcid, Bentyl and nausea medication.  She comes in today for follow-up  Liquid diet only since being seen in the ER.  She states she still having intermittent chills and abdominal issues " "but does feel like it is improving.  She denies any fever.  Blood pressure stable at 102/64, pulse 69.  She is accompanied today by her  Godwin.  He has been eating and her care since she has been home and making sure her medications have been taken.  She denies any diarrhea, she states she is having normal bowel movements, no blood in stool.    Patient has had a longstanding issue with abdominal pain, per her spouse she had issues with H. pylori, in the 90s she was diagnosed with hepatitis A, and in 2020 she had a colonoscopy showing multiple gastric polyps and colon polyps.  CT in ER showed inflammatory wall thickening involving the terminal ileum, cecum , And ascending colon.  This could be secondary to infectious inflammatory etiology, Crohn's could have this appearance.  There is in monitoring colonic stool burden within the transverse descending colon.  There was a 9 mm hepatic cyst and a 4 mm pulmonary nodule.  I did recommend a follow-up CT in 1 year to reassess pulmonary nodule      Objective   Vital Signs:   /64   Pulse 69   Temp 98 °F (36.7 °C)   Ht 157.5 cm (62\")   Wt 51 kg (112 lb 6.4 oz)   SpO2 100%   BMI 20.56 kg/m²       Wt Readings from Last 3 Encounters:   08/15/24 51 kg (112 lb 6.4 oz)   08/13/24 52.7 kg (116 lb 2.9 oz)   08/12/24 53 kg (116 lb 13.5 oz)        BP Readings from Last 3 Encounters:   08/15/24 102/64   08/13/24 110/70   08/12/24 114/68        BMI is within normal parameters. No other follow-up for BMI required.       Physical Exam  Vitals reviewed.   Constitutional:       General: She is not in acute distress.     Appearance: Normal appearance. She is well-developed. She is not ill-appearing.   HENT:      Head: Normocephalic and atraumatic.      Right Ear: External ear normal.      Left Ear: External ear normal.   Eyes:      Conjunctiva/sclera: Conjunctivae normal.      Pupils: Pupils are equal, round, and reactive to light.   Cardiovascular:      Rate and Rhythm: " Normal rate and regular rhythm.      Heart sounds: No murmur heard.  Pulmonary:      Effort: Pulmonary effort is normal.      Breath sounds: Normal breath sounds. No wheezing.   Abdominal:      General: Abdomen is flat. Bowel sounds are normal. There is no distension.      Palpations: Abdomen is soft.      Tenderness: There is abdominal tenderness (mild).   Musculoskeletal:      Right lower leg: No edema.      Left lower leg: No edema.   Skin:     General: Skin is warm and dry.   Neurological:      Mental Status: She is alert and oriented to person, place, and time.   Psychiatric:         Mood and Affect: Mood and affect normal.         Behavior: Behavior normal.         Thought Content: Thought content normal.         Judgment: Judgment normal.        Result Review :  {The following data was reviewed by JONAS Caba on 08/15/2024.  Common labs          6/7/2024    11:08 8/12/2024    10:39 8/13/2024    11:45   Common Labs   Glucose  100  122    BUN  8  14    Creatinine  0.71  0.66    Sodium  140  132    Potassium  3.6  3.1    Chloride  102  97    Calcium  9.4  8.6    Albumin  4.2  3.8    Total Bilirubin  0.5  0.3    Alkaline Phosphatase  70  62    AST (SGOT)  14  13    ALT (SGPT)  10  5    WBC  8.92  6.33    Hemoglobin  13.0  12.7    Hematocrit  40.8  39.2    Platelets  236  202    Hemoglobin A1C 5.70        Data reviewed : ER note.CT of Abdomen           lease view results for these tests on the individual orders.      COVID-19, FLU A/B, RSV PCR 1 HR TAT - Swab, Nasopharynx [[id number]]  (Normal) Collected: 08/12/24 1145     Specimen: Swab from Nasopharynx Updated: 08/12/24 1311       COVID19 Not Detected       Influenza A PCR Not Detected       Influenza B PCR Not Detected       RSV, PCR Not Detected       Respiratory Panel PCR w/COVID-19(SARS-CoV-2) ARELIS/DAYLIN/SONDRA/PAD/COR/RICHARD In-House, NP Swab in UTM/VTM, 2 HR TAT - Swab, Nasopharynx [[id number]]  (Normal) Collected: 08/12/24 1447      Specimen: Swab  from Nasopharynx Updated: 08/12/24 1549       ADENOVIRUS, PCR Not Detected       Coronavirus 229E Not Detected       Coronavirus HKU1 Not Detected       Coronavirus NL63 Not Detected       Coronavirus OC43 Not Detected       COVID19 Not Detected       Human Metapneumovirus Not Detected       Human Rhinovirus/Enterovirus Not Detected       Influenza A PCR Not Detected       Influenza B PCR Not Detected       Parainfluenza Virus 1 Not Detected       Parainfluenza Virus 2 Not Detected       Parainfluenza Virus 3 Not Detected       Parainfluenza Virus 4 Not Detected       RSV, PCR Not Detected       Bordetella pertussis pcr Not Detected       Bordetella parapertussis PCR Not Detected       Chlamydophila pneumoniae PCR Not Detected       Mycoplasma pneumo by PCR Not Detected   CT Abdomen Pelvis With Contrast     Result Date: 8/13/2024  CT ABDOMEN PELVIS W CONTRAST Date of Exam: 8/13/2024 2:01 PM EDT Indication: Abdominal pain. Comparison: None available Technique: Axial CT images were obtained of the abdomen and pelvis after the uneventful intravenous administration of iodinated contrast. Reconstructed coronal and sagittal images were also obtained. Automated exposure control and iterative construction methods were used. Findings: The heart size is normal. There is no pericardial effusion. There is a 4 mm nodule within the lateral aspect of the right lower lobe best seen on image 8 of series 201. There is a 3 mm nodule within the left lower lobe best seen on image 4 of series 201. The liver is normal in size and contour. There is a 9 mm low-density lesion within the lateral right hepatic lobe the liver likely representing a small cyst. The gallbladder is present without wall thickening. There is no intrahepatic or extrahepatic biliary ductal dilatation. The spleen is normal in size. The adrenal glands and pancreas appear within normal limits. The kidneys are symmetric in size and enhancement. There is no hydronephrosis  or hydroureter. The urinary bladder is fluid-filled without wall thickening. The uterus is present and retroflexed. There is a 16 mm fibroid along the anterior uterine body. There our dilated parametrial vessels which can be seen with pelvic congestion syndrome in the correct clinical setting. The stomach and duodenum are normal in caliber and configuration. There are no abnormally dilated loops of small bowel to suggest small bowel obstruction. The appendix is not well visualized however there are no secondary signs of acute appendicitis. There is abnormal wall thickening involving the terminal ileum, cecum, and ascending colon. This can be seen with infectious or inflammatory ileitis and colitis. Crohn's disease could also have this appearance. There is a moderate to large colonic stool burden within the transverse and descending colon. The aorta is normal in caliber without evidence of aneurysm formation. The portal vein is patent. There are reactive right lower quadrant mesenteric lymph nodes. There is degenerative disc disease at L4-L5 and L5-S1.      Impression: 1. Inflammatory wall thickening involving the terminal ileum, cecum, and ascending colon. This could be secondary to infectious or inflammatory etiology. Crohn's disease could have this appearance. 2. Moderate colonic stool burden within the transverse and descending colon. 3. Small 9 mm simple cyst within the right hepatic lobe of the liver. 4. Small 4 mm right lower lobe pulmonary nodule. A follow-up chest CT in 1 year will be recommended if patient has risk factors such as smoking history. Electronically Signed: Keyon Vigil  8/13/2024 2:31 PM EDT  Workstation ID: [id number]     XR Chest 1 View     Result Date: 8/12/2024  XR CHEST 1 VW Date of Exam: 8/12/2024 3:00 PM EDT Indication: Shortness of air and fever Comparison: None available. Findings: The heart and mediastinal contours are within normal limits. Lungs are grossly clear. Osseous  structures are unremarkable.      Impression: No acute process Electronically Signed: Juan Jose Sebastian MD  8/12/2024 3:23 PM EDT  Workstation ID: [id number]  Current Outpatient Medications on File Prior to Visit   Medication Sig Dispense Refill    atorvastatin (LIPITOR) 10 MG tablet Take 1 tablet by mouth Daily. 90 tablet 1    azelaic acid (AZELEX) 15 % gel       buPROPion SR (Wellbutrin SR) 150 MG 12 hr tablet Take 1 tablet by mouth 2 (Two) Times a Day. 60 tablet 1    calcium carbonate (OS-KRUNAL) 1250 (500 Ca) MG tablet Take 1 tablet by mouth Daily. 90 tablet 1    ciprofloxacin (CIPRO) 500 MG tablet Take 1 tablet by mouth Every 12 (Twelve) Hours. 20 tablet 0    desonide (DESOWEN) 0.05 % cream APPLY TOPICALLY TO THE AFFECTED AREA TWICE DAILY AS NEEDED FOR ITCHY SKIN      dicyclomine (BENTYL) 20 MG tablet Take 1 tablet by mouth Every 6 (Six) Hours. 20 tablet 0    doxycycline (VIBRAMYCIN) 100 MG capsule       famotidine (PEPCID) 20 MG tablet Take 1 tablet by mouth 2 (Two) Times a Day. 30 tablet 0    metroNIDAZOLE (FLAGYL) 500 MG tablet Take 1 tablet by mouth 3 (Three) Times a Day. 21 tablet 0    naltrexone (DEPADE) 50 MG tablet Take 0.5 tablets by mouth Daily. 15 tablet 1    ondansetron ODT (ZOFRAN-ODT) 4 MG disintegrating tablet Place 1 tablet on the tongue Every 8 (Eight) Hours As Needed for Nausea or Vomiting. 15 tablet 0    rizatriptan MLT (Maxalt-MLT) 10 MG disintegrating tablet Place 1 tablet on the tongue 1 (One) Time As Needed for Migraine. May repeat in 2 hours if needed 27 tablet 1    Tranexamic Acid 650 MG tablet       triamcinolone (KENALOG) 0.1 % cream Apply 1 Application topically to the appropriate area as directed 2 (Two) Times a Day. 45 g 1    vitamin D (ERGOCALCIFEROL) 1.25 MG (61992 UT) capsule capsule Take 1 capsule by mouth 1 (One) Time Per Week. 13 capsule 1    Vitamin D, Cholecalciferol, 25 MCG (1000 UT) capsule Take 1 capsule by mouth Daily. 90 capsule 1    vitamin E 100 UNIT capsule Take 1 capsule  by mouth Daily.      calcium carbonate, oyster shell, 500 MG tablet tablet        No current facility-administered medications on file prior to visit.        Assessment and Plan  Diagnoses and all orders for this visit:    1. ER Follow-up exam (Primary)    2. Colitis  -     Ambulatory Referral to Gastroenterology    3. Abdominal pain, generalized  -     Ambulatory Referral to Gastroenterology    4. Nausea    Will go ahead and refer back to gastroenterology, patient has never been involved with the care of gastroenterology she would like to see the provider she had seen previously they did a colonoscopy in 2020.  Discussed to start introducing some solid foods, discussed the bland diet may be small finger rice, or chicken broth, patient states she will try that today, I did discuss returning back to the ER if pain becomes worse, running a fever, or uncontrolled nausea or vomiting.  Patient verbalized understanding and agreeable with treatment plan.    Follow Up   Return for If symptoms do not improve new concerning symptoms.  Patient was given instructions and counseling regarding her condition or for health maintenance advice. Please see specific information pulled into the AVS if appropriate.       Part of this note may be electronic transcription/translation of spoken language to printed text using the Dragon dictation system

## 2024-08-17 LAB
BACTERIA SPEC AEROBE CULT: NORMAL
BACTERIA SPEC AEROBE CULT: NORMAL

## 2024-09-16 ENCOUNTER — PATIENT MESSAGE (OUTPATIENT)
Dept: FAMILY MEDICINE CLINIC | Facility: CLINIC | Age: 54
End: 2024-09-16
Payer: OTHER GOVERNMENT

## 2024-09-16 DIAGNOSIS — R91.8 MULTIPLE LUNG NODULES: Primary | ICD-10-CM

## 2024-09-18 DIAGNOSIS — R91.8 MULTIPLE LUNG NODULES: ICD-10-CM

## 2024-09-18 DIAGNOSIS — R91.8 PULMONARY NODULES: Primary | ICD-10-CM

## 2024-10-07 ENCOUNTER — HOSPITAL ENCOUNTER (OUTPATIENT)
Dept: CT IMAGING | Facility: HOSPITAL | Age: 54
Discharge: HOME | End: 2024-10-07
Admitting: NURSE PRACTITIONER
Payer: OTHER GOVERNMENT

## 2024-10-07 DIAGNOSIS — R91.8 MULTIPLE LUNG NODULES: ICD-10-CM

## 2024-10-07 DIAGNOSIS — R91.8 PULMONARY NODULES: ICD-10-CM

## 2024-10-07 PROCEDURE — 71250 CT THORAX DX C-: CPT

## 2024-10-10 ENCOUNTER — TELEPHONE (OUTPATIENT)
Dept: FAMILY MEDICINE CLINIC | Facility: CLINIC | Age: 54
End: 2024-10-10
Payer: OTHER GOVERNMENT

## 2024-10-10 DIAGNOSIS — H26.9 CATARACT OF BOTH EYES, UNSPECIFIED CATARACT TYPE: Primary | ICD-10-CM

## 2024-10-14 ENCOUNTER — OFFICE VISIT (OUTPATIENT)
Dept: GASTROENTEROLOGY | Facility: CLINIC | Age: 54
End: 2024-10-14
Payer: OTHER GOVERNMENT

## 2024-10-14 VITALS
BODY MASS INDEX: 21.09 KG/M2 | HEART RATE: 69 BPM | SYSTOLIC BLOOD PRESSURE: 118 MMHG | HEIGHT: 62 IN | WEIGHT: 114.6 LBS | DIASTOLIC BLOOD PRESSURE: 71 MMHG

## 2024-10-14 DIAGNOSIS — Z86.0101 HISTORY OF ADENOMATOUS POLYP OF COLON: ICD-10-CM

## 2024-10-14 DIAGNOSIS — R10.12 LEFT UPPER QUADRANT ABDOMINAL PAIN: Primary | ICD-10-CM

## 2024-10-14 DIAGNOSIS — Z86.19 HISTORY OF HELICOBACTER PYLORI INFECTION: ICD-10-CM

## 2024-10-14 DIAGNOSIS — R93.3 ABNORMAL CT SCAN, COLON: ICD-10-CM

## 2024-10-14 DIAGNOSIS — R68.81 EARLY SATIETY: ICD-10-CM

## 2024-10-14 DIAGNOSIS — K59.00 CONSTIPATION, UNSPECIFIED CONSTIPATION TYPE: ICD-10-CM

## 2024-10-14 DIAGNOSIS — Z80.0 FH: COLON CANCER: ICD-10-CM

## 2024-10-14 PROCEDURE — 99214 OFFICE O/P EST MOD 30 MIN: CPT | Performed by: NURSE PRACTITIONER

## 2024-10-14 RX ORDER — PEG-3350, SODIUM SULFATE, SODIUM CHLORIDE, POTASSIUM CHLORIDE, SODIUM ASCORBATE AND ASCORBIC ACID 7.5-2.691G
1000 KIT ORAL EVERY 12 HOURS
Qty: 2000 ML | Refills: 0 | Status: SHIPPED | OUTPATIENT
Start: 2024-10-14

## 2024-10-14 NOTE — PROGRESS NOTES
Chief Complaint        Abdominal Pain, Diarrhea, and Constipation    History of Present Illness      Marce Moses is a 54 y.o. female who presents today accompanied by her  to [facility] as a new patient for abdominal pain.    She admits in August she woke up in the middle of the night with worsening LUQ abd pain/epigastric pain. She ended up going to the ER.       She underwent CT scan of the abdomen and pelvis on 8/13/2024 for abdominal pain.  It showed inflammatory wall thickening involving the terminal ileum, cecum and ascending colon.  This could be secondary to infectious or inflammatory etiology.  Crohn's disease could have this appearance.  Moderate colonic stool burden within the transverse and descending colon.  Small 9 mm simple cyst within the right hepatic lobe of the liver.  Small 4 mm right lower lobe pulmonary nodule.  A follow-up chest CT in 1 year will be recommended the patient has risk factors such as smoking history.    She underwent an EGD and colonoscopy with Dr. Medel on 9/8/2020.  EGD showed gastritis.  Normal mucosa in the whole esophagus.  Gastric polyps.  Colonoscopy showed a 3 mm to 5 mm polyps removed in the transverse colon.  Path positive for tubular adenoma.  Repeat colonoscopy in 5 years.    Most recent labs show a normal lipase.  Normal LFTs.  White count normal.  Hemoglobin normal.    Hx acute hepatitis in 1996/1997. She admits since then she has had issues with worsening upper abd pain. She admits her bowels move every 3-4 days. She will have a stool softener from [facility] every 4 or 5 days. She denies any rectal bleeding or melena. She will have worsening LUQ pain after she eats. She has early satiety. Will sometimes have a bad taste in her mouth. She denies any dysphagia. She does avoid water so she doesn't have to urinate a lot. She did finish the abx. She did take pepcid 20 mg PRN. It was the most helpful.     GI FH---Mother side  with colon cancer.       Results       Result Review :   The following data was reviewed by: JONAS Abdalla on 10/14/2024     CMP          6/5/2024    12:00 8/12/2024    10:39 8/13/2024    11:45   CMP   Glucose 89  100  122    BUN 9  8  14    Creatinine 0.60  0.71  0.66    EGFR 107.5  101.2  104.4    Sodium 141  140  132    Potassium 3.9  3.6  3.1    Chloride 104  102  97    Calcium 9.6  9.4  8.6    Total Protein 7.8  7.6  7.2    Albumin 4.4  4.2  3.8    Globulin 3.4  3.4  3.4    Total Bilirubin 0.4  0.5  0.3    Alkaline Phosphatase 74  70  62    AST (SGOT) 16  14  13    ALT (SGPT) 9  10  5    Albumin/Globulin Ratio 1.3  1.2  1.1    BUN/Creatinine Ratio 15.0  11.3  21.2    Anion Gap 9.9  13.4  12.0      CBC          6/5/2024    12:00 8/12/2024    10:39 8/13/2024    11:45   CBC   WBC 4.33  8.92  6.33    RBC 4.55  4.27  4.23    Hemoglobin 13.7  13.0  12.7    Hematocrit 42.7  40.8  39.2    MCV 93.8  95.6  92.7    MCH 30.1  30.4  30.0    MCHC 32.1  31.9  32.4    RDW 11.8  12.4  12.7    Platelets 253  236  202      CBC w/diff          6/5/2024    12:00 8/12/2024    10:39 8/13/2024    11:45   CBC w/Diff   WBC 4.33  8.92  6.33    RBC 4.55  4.27  4.23    Hemoglobin 13.7  13.0  12.7    Hematocrit 42.7  40.8  39.2    MCV 93.8  95.6  92.7    MCH 30.1  30.4  30.0    MCHC 32.1  31.9  32.4    RDW 11.8  12.4  12.7    Platelets 253  236  202    Neutrophil Rel % 59.6  94.0  87.8    Immature Granulocyte Rel % 0.2  0.3  0.3    Lymphocyte Rel % 32.8  2.4  5.2    Monocyte Rel % 5.1  3.1  3.6    Eosinophil Rel % 1.6  0.0  2.8    Basophil Rel % 0.7  0.2  0.3      Lipid Panel          6/5/2024    12:00   Lipid Panel   Total Cholesterol 242    Triglycerides 92    HDL Cholesterol 85    VLDL Cholesterol 16    LDL Cholesterol  141    LDL/HDL Ratio 1.63      TSH          6/5/2024    12:00   TSH   TSH 1.590        Lipase   Lipase   Date Value Ref Range Status   08/13/2024 29 13 - 60 U/L Final     Amylase No results found for:  "\"AMYLASE\"  Iron Profile No results found for: \"IRON\", \"TIBC\", \"LABIRON\", \"TRANSFERRIN\"  Ferritin No results found for: \"FERRITIN\"         Past Medical History       Past Medical History:   Diagnosis Date    Allergy 1994    GERD (gastroesophageal reflux disease) 01/14/2020    Helicobacter pylori infection 01/14/2020    Hepatitis A 1996    Migraine     Ovarian cyst     TMJ syndrome 1999    Upset stomach     Vertigo 02/17/2020       Past Surgical History:   Procedure Laterality Date    COLONOSCOPY  2020    polyps removed    TEMPOROMANDIBULAR JOINT SURGERY  1999    UPPER GASTROINTESTINAL ENDOSCOPY  2020         Current Outpatient Medications:     atorvastatin (LIPITOR) 10 MG tablet, Take 1 tablet by mouth Daily. (Patient not taking: Reported on 10/14/2024), Disp: 90 tablet, Rfl: 1    azelaic acid (AZELEX) 15 % gel, , Disp: , Rfl:     buPROPion SR (Wellbutrin SR) 150 MG 12 hr tablet, Take 1 tablet by mouth 2 (Two) Times a Day. (Patient not taking: Reported on 10/14/2024), Disp: 60 tablet, Rfl: 1    calcium carbonate (OS-KRUNAL) 1250 (500 Ca) MG tablet, Take 1 tablet by mouth Daily. (Patient not taking: Reported on 10/14/2024), Disp: 90 tablet, Rfl: 1    desonide (DESOWEN) 0.05 % cream, APPLY TOPICALLY TO THE AFFECTED AREA TWICE DAILY AS NEEDED FOR ITCHY SKIN (Patient not taking: Reported on 10/14/2024), Disp: , Rfl:     doxycycline (VIBRAMYCIN) 100 MG capsule, , Disp: , Rfl:     naltrexone (DEPADE) 50 MG tablet, Take 0.5 tablets by mouth Daily. (Patient not taking: Reported on 10/14/2024), Disp: 15 tablet, Rfl: 1    PEG-KCl-NaCl-NaSulf-Na Asc-C (MoviPrep) 100 g reconstituted solution powder, Take 1,000 mL by mouth Every 12 (Twelve) Hours., Disp: 2000 mL, Rfl: 0    rizatriptan MLT (Maxalt-MLT) 10 MG disintegrating tablet, Place 1 tablet on the tongue 1 (One) Time As Needed for Migraine. May repeat in 2 hours if needed (Patient not taking: Reported on 10/14/2024), Disp: 27 tablet, Rfl: 1    Tranexamic Acid 650 MG tablet, , " "Disp: , Rfl:     triamcinolone (KENALOG) 0.1 % cream, Apply 1 Application topically to the appropriate area as directed 2 (Two) Times a Day. (Patient not taking: Reported on 10/14/2024), Disp: 45 g, Rfl: 1    vitamin D (ERGOCALCIFEROL) 1.25 MG (60261 UT) capsule capsule, Take 1 capsule by mouth 1 (One) Time Per Week. (Patient not taking: Reported on 10/14/2024), Disp: 13 capsule, Rfl: 1    Vitamin D, Cholecalciferol, 25 MCG (1000 UT) capsule, Take 1 capsule by mouth Daily. (Patient not taking: Reported on 10/14/2024), Disp: 90 capsule, Rfl: 1    vitamin E 100 UNIT capsule, Take 1 capsule by mouth Daily. (Patient not taking: Reported on 10/14/2024), Disp: , Rfl:      Allergies   Allergen Reactions    Shellfish-Derived Products Other (See Comments)     Chest pain       Family History   Problem Relation Age of Onset    Colon cancer Mother         mom    Stroke Father     Heart disease Father     Colon cancer Maternal Grandmother         G-grandma    Heart disease Maternal Grandmother     Heart disease Other     Uterine cancer Other     Heart disease Other         Social History     Social History Narrative    Not on file       Objective       Objective     Vital Signs:   /71 (BP Location: Left arm, Patient Position: Sitting, Cuff Size: Adult)   Pulse 69   Ht 157.5 cm (62\")   Wt 52 kg (114 lb 9.6 oz)   BMI 20.96 kg/m²     Body mass index is 20.96 kg/m².    Review of Systems     Physical Exam  Constitutional:       General: She is not in acute distress.     Appearance: She is well-developed. She is not ill-appearing.   HENT:      Head: Normocephalic.   Eyes:      Pupils: Pupils are equal, round, and reactive to light.   Cardiovascular:      Rate and Rhythm: Normal rate and regular rhythm.      Heart sounds: Normal heart sounds.   Pulmonary:      Effort: Pulmonary effort is normal.      Breath sounds: Normal breath sounds.   Abdominal:      General: Bowel sounds are normal. There is no distension.      " Palpations: Abdomen is soft. There is no mass.      Tenderness: There is abdominal tenderness. There is no guarding or rebound.      Hernia: No hernia is present.       Musculoskeletal:         General: Normal range of motion.   Skin:     General: Skin is warm and dry.   Neurological:      Mental Status: She is alert and oriented to person, place, and time.   Psychiatric:         Speech: Speech normal.         Behavior: Behavior normal.         Judgment: Judgment normal.              Assessment & Plan          Assessment and Plan    Diagnoses and all orders for this visit:    1. Left upper quadrant abdominal pain (Primary)  -     Case Request; Standing  -     Follow Anesthesia Guidelines / Protocol; Future  -     Case Request  -     PEG-KCl-NaCl-NaSulf-Na Asc-C (MoviPrep) 100 g reconstituted solution powder; Take 1,000 mL by mouth Every 12 (Twelve) Hours.  Dispense: 2000 mL; Refill: 0    2. Abnormal CT scan, colon  -     Case Request; Standing  -     Follow Anesthesia Guidelines / Protocol; Future  -     Case Request  -     PEG-KCl-NaCl-NaSulf-Na Asc-C (MoviPrep) 100 g reconstituted solution powder; Take 1,000 mL by mouth Every 12 (Twelve) Hours.  Dispense: 2000 mL; Refill: 0    3. Early satiety  -     Case Request; Standing  -     Follow Anesthesia Guidelines / Protocol; Future  -     Case Request  -     PEG-KCl-NaCl-NaSulf-Na Asc-C (MoviPrep) 100 g reconstituted solution powder; Take 1,000 mL by mouth Every 12 (Twelve) Hours.  Dispense: 2000 mL; Refill: 0    4. Constipation, unspecified constipation type  -     Case Request; Standing  -     Follow Anesthesia Guidelines / Protocol; Future  -     Case Request  -     PEG-KCl-NaCl-NaSulf-Na Asc-C (MoviPrep) 100 g reconstituted solution powder; Take 1,000 mL by mouth Every 12 (Twelve) Hours.  Dispense: 2000 mL; Refill: 0    5. History of Helicobacter pylori infection  -     Case Request; Standing  -     Follow Anesthesia Guidelines / Protocol; Future  -     Case  Request  -     PEG-KCl-NaCl-NaSulf-Na Asc-C (MoviPrep) 100 g reconstituted solution powder; Take 1,000 mL by mouth Every 12 (Twelve) Hours.  Dispense: 2000 mL; Refill: 0    6. History of adenomatous polyp of colon  -     Case Request; Standing  -     Follow Anesthesia Guidelines / Protocol; Future  -     Case Request  -     PEG-KCl-NaCl-NaSulf-Na Asc-C (MoviPrep) 100 g reconstituted solution powder; Take 1,000 mL by mouth Every 12 (Twelve) Hours.  Dispense: 2000 mL; Refill: 0    7. FH: colon cancer  -     Case Request; Standing  -     Follow Anesthesia Guidelines / Protocol; Future  -     Case Request  -     PEG-KCl-NaCl-NaSulf-Na Asc-C (MoviPrep) 100 g reconstituted solution powder; Take 1,000 mL by mouth Every 12 (Twelve) Hours.  Dispense: 2000 mL; Refill: 0    Other orders  -     Verify NPO; Standing  -     Verify Bowel Prep Was Successful; Standing  -     Give Tap Water Enema If Bowel Prep Insufficient; Standing      Reviewed medical history with her today.  Given her history and current symptoms recommend EGD and colonoscopy with Dr. Medel for further evaluation.  Patient is agreeable to the scopes.  No blood thinners, no clearances.  Movie prep.  Continue Pepcid OTC as needed.  Would like her to start taking the stool softeners every night to help with her constipation.  Patient to call the office with any issues.  Patient to follow-up with me after her scopes.  Patient is agreeable to the plan.    Surgical Risk and Benefits discussed: Possible risks/complications, benefits, and alternatives to surgical or invasive procedure have been explained to patient and/or legal guardian; risks include bleeding, infection, and perforation. Patient has been evaluated and can tolerate anesthesia and/or sedation. Risks, benefits, and alternatives to anesthesia and sedation have been explained to patient and/or legal guardian.      Follow Up       Follow Up   Return for F/U AFTER PROCEDURE.  Patient was given instructions  and counseling regarding her condition or for health maintenance advice. Please see specific information pulled into the AVS if appropriate.

## 2024-11-04 ENCOUNTER — OFFICE VISIT (OUTPATIENT)
Dept: PULMONOLOGY | Facility: CLINIC | Age: 54
End: 2024-11-04
Payer: OTHER GOVERNMENT

## 2024-11-04 VITALS
WEIGHT: 113.4 LBS | OXYGEN SATURATION: 99 % | TEMPERATURE: 98.9 F | BODY MASS INDEX: 20.87 KG/M2 | DIASTOLIC BLOOD PRESSURE: 90 MMHG | HEIGHT: 62 IN | SYSTOLIC BLOOD PRESSURE: 130 MMHG | RESPIRATION RATE: 14 BRPM | HEART RATE: 65 BPM

## 2024-11-04 DIAGNOSIS — K21.9 GASTROESOPHAGEAL REFLUX DISEASE WITHOUT ESOPHAGITIS: ICD-10-CM

## 2024-11-04 DIAGNOSIS — R91.8 LUNG NODULES: Primary | ICD-10-CM

## 2024-11-04 PROCEDURE — 99203 OFFICE O/P NEW LOW 30 MIN: CPT | Performed by: INTERNAL MEDICINE

## 2024-11-04 RX ORDER — FAMOTIDINE 20 MG/1
20 TABLET, FILM COATED ORAL
Status: SHIPPED | OUTPATIENT
Start: 2024-11-04

## 2024-11-04 NOTE — PROGRESS NOTES
Pulmonary Consultation    Adolfo Singh, *,    Thank you for asking me to see Marce Moses for   Chief Complaint   Patient presents with    Establish Care     Abnormal ct    Cough    Shortness of Breath   .      History of Present Illness  Marce Moses is a 54 y.o. female with a PMH significant for gastroesophageal reflux disease and gastritis with Helicobacter infection presents on account of abnormal CT chest patient complains of mild shortness of breath along with pain in her epigastrium off and on she denies any cough hemoptysis expectoration or wheezing she does not smoke there is no history of weight loss      Tobacco use history:  Never smoker      Review of Systems: History obtained from chart review and the patient.  Review of Systems   Respiratory:  Positive for shortness of breath.    Gastrointestinal:  Positive for abdominal pain.   All other systems reviewed and are negative.    As described in the HPI. Otherwise, remainder of ROS (14 systems) were negative.    Patient Active Problem List   Diagnosis    GERD (gastroesophageal reflux disease)    Helicobacter pylori gastrointestinal tract infection    Hepatitis A    Migraine    Ovarian cyst    Cervical disc herniation    TMJ syndrome    Jaw pain    Bilateral temporomandibular joint pain    Skin lesions, generalized    Left upper quadrant abdominal pain    Abnormal CT scan, colon    Early satiety    Constipation    History of Helicobacter pylori infection    History of adenomatous polyp of colon    FH: colon cancer         Current Outpatient Medications:     atorvastatin (LIPITOR) 10 MG tablet, Take 1 tablet by mouth Daily. (Patient not taking: Reported on 11/4/2024), Disp: 90 tablet, Rfl: 1    azelaic acid (AZELEX) 15 % gel, , Disp: , Rfl:     buPROPion SR (Wellbutrin SR) 150 MG 12 hr tablet, Take 1 tablet by mouth 2 (Two) Times a Day. (Patient not taking: Reported on 11/4/2024), Disp: 60 tablet, Rfl: 1    calcium carbonate (OS-KRUNAL) 1250 (500  Ca) MG tablet, Take 1 tablet by mouth Daily. (Patient not taking: Reported on 11/4/2024), Disp: 90 tablet, Rfl: 1    desonide (DESOWEN) 0.05 % cream, APPLY TOPICALLY TO THE AFFECTED AREA TWICE DAILY AS NEEDED FOR ITCHY SKIN (Patient not taking: Reported on 11/4/2024), Disp: , Rfl:     doxycycline (VIBRAMYCIN) 100 MG capsule, , Disp: , Rfl:     naltrexone (DEPADE) 50 MG tablet, Take 0.5 tablets by mouth Daily. (Patient not taking: Reported on 11/4/2024), Disp: 15 tablet, Rfl: 1    PEG-KCl-NaCl-NaSulf-Na Asc-C (MoviPrep) 100 g reconstituted solution powder, Take 1,000 mL by mouth Every 12 (Twelve) Hours. (Patient not taking: Reported on 11/4/2024), Disp: 2000 mL, Rfl: 0    rizatriptan MLT (Maxalt-MLT) 10 MG disintegrating tablet, Place 1 tablet on the tongue 1 (One) Time As Needed for Migraine. May repeat in 2 hours if needed (Patient not taking: Reported on 11/4/2024), Disp: 27 tablet, Rfl: 1    Tranexamic Acid 650 MG tablet, , Disp: , Rfl:     triamcinolone (KENALOG) 0.1 % cream, Apply 1 Application topically to the appropriate area as directed 2 (Two) Times a Day. (Patient not taking: Reported on 11/4/2024), Disp: 45 g, Rfl: 1    vitamin D (ERGOCALCIFEROL) 1.25 MG (44275 UT) capsule capsule, Take 1 capsule by mouth 1 (One) Time Per Week. (Patient not taking: Reported on 11/4/2024), Disp: 13 capsule, Rfl: 1    Vitamin D, Cholecalciferol, 25 MCG (1000 UT) capsule, Take 1 capsule by mouth Daily. (Patient not taking: Reported on 11/4/2024), Disp: 90 capsule, Rfl: 1    vitamin E 100 UNIT capsule, Take 1 capsule by mouth Daily. (Patient not taking: Reported on 11/4/2024), Disp: , Rfl:     Current Facility-Administered Medications:     famotidine (PEPCID) tablet 20 mg, 20 mg, Oral, BID Jerome COLIN Imtiaz, MD    Allergies   Allergen Reactions    Shellfish-Derived Products Other (See Comments)     Chest pain       Past Medical History:   Diagnosis Date    Allergy 1994    GERD (gastroesophageal reflux disease) 01/14/2020     Helicobacter pylori infection 01/14/2020    Hepatitis A 1996    Migraine     Ovarian cyst     TMJ syndrome 1999    Upset stomach     Vertigo 02/17/2020     Past Surgical History:   Procedure Laterality Date    COLONOSCOPY  2020    polyps removed    TEMPOROMANDIBULAR JOINT SURGERY  1999    UPPER GASTROINTESTINAL ENDOSCOPY  2020     Social History     Socioeconomic History    Marital status:    Tobacco Use    Smoking status: Never    Smokeless tobacco: Never   Vaping Use    Vaping status: Never Used   Substance and Sexual Activity    Alcohol use: Never    Drug use: Never    Sexual activity: Yes     Partners: Male     Family History   Problem Relation Age of Onset    Colon cancer Mother         mom    Stroke Father     Heart disease Father     Colon cancer Maternal Grandmother         G-grandma    Heart disease Maternal Grandmother     Heart disease Other     Uterine cancer Other     Heart disease Other        CT Chest Without Contrast Diagnostic    Result Date: 10/8/2024  Impression: Several pulmonary nodules as noted. Per Fleischner criteria, if this is a low-risk patient, follow-up CT at 12 months is indicated. If this is a high-risk patient, follow-up CT at 6 to 12 months is recommended. Electronically Signed: Amee Merino MD  10/8/2024 9:08 AM EDT  Workstation ID: [id number]    CT Abdomen Pelvis With Contrast    Result Date: 8/13/2024  Impression: 1. Inflammatory wall thickening involving the terminal ileum, cecum, and ascending colon. This could be secondary to infectious or inflammatory etiology. Crohn's disease could have this appearance. 2. Moderate colonic stool burden within the transverse and descending colon. 3. Small 9 mm simple cyst within the right hepatic lobe of the liver. 4. Small 4 mm right lower lobe pulmonary nodule. A follow-up chest CT in 1 year will be recommended if patient has risk factors such as smoking history. Electronically Signed: Keyon Vigil  8/13/2024 2:31 PM EDT   "Workstation ID: [id number]    XR Chest 1 View    Result Date: 8/12/2024  Impression: No acute process Electronically Signed: Juan Jose Sebastian MD  8/12/2024 3:23 PM EDT  Workstation ID: [id number]         Objective     Blood pressure 130/90, pulse 65, temperature 98.9 °F (37.2 °C), temperature source Tympanic, resp. rate 14, height 157.5 cm (62\"), weight 51.4 kg (113 lb 6.4 oz), SpO2 99%, not currently breastfeeding.  Physical Exam  Vitals and nursing note reviewed.   Constitutional:       Appearance: Normal appearance.   HENT:      Head: Normocephalic and atraumatic.      Nose: Nose normal.      Mouth/Throat:      Mouth: Mucous membranes are moist.      Pharynx: Oropharynx is clear.   Eyes:      Extraocular Movements: Extraocular movements intact.      Conjunctiva/sclera: Conjunctivae normal.      Pupils: Pupils are equal, round, and reactive to light.   Cardiovascular:      Rate and Rhythm: Normal rate and regular rhythm.      Pulses: Normal pulses.      Heart sounds: Normal heart sounds.   Pulmonary:      Effort: Pulmonary effort is normal.      Breath sounds: Normal breath sounds.   Abdominal:      General: Abdomen is flat. Bowel sounds are normal.      Palpations: Abdomen is soft.   Musculoskeletal:         General: Normal range of motion.      Cervical back: Normal range of motion and neck supple.   Skin:     General: Skin is warm.      Capillary Refill: Capillary refill takes 2 to 3 seconds.   Neurological:      Mental Status: She is alert and oriented to person, place, and time.   Psychiatric:         Mood and Affect: Mood normal.         Behavior: Behavior normal.       Immunization History   Administered Date(s) Administered    COVID-19 (MODERNA) 1st,2nd,3rd Dose Monovalent 02/05/2021, 03/04/2021    COVID-19 (MODERNA) Monovalent Original Booster 02/05/2021, 03/04/2021    Influenza, Unspecified 10/01/2019    PPD Test 02/02/2024            Assessment & Plan     Diagnoses and all orders for this visit:    1. Lung " nodules (Primary)  -     famotidine (PEPCID) tablet 20 mg  -     Complete PFT - Pre & Post Bronchodilator; Future  -     CT Chest With Contrast; Future    2. Gastroesophageal reflux disease without esophagitis  -     famotidine (PEPCID) tablet 20 mg  -     Complete PFT - Pre & Post Bronchodilator; Future  -     CT Chest With Contrast; Future         Result Review :       Data reviewed : Radiologic studies CT chest      Discussion/ Recommendations:   CT chest reviewed patient has multiple small subcentimeter nodules likely residual of pneumonia  Patient is low risk  Will repeat CT chest in 1 year  Will order PFTs  Will start her on Pepcid for reflux  Continue regular exercise  Discussed vaccination and recommended    BMI is within normal parameters. No other follow-up for BMI required.           Return in about 3 months (around 2/4/2025).      Thank you for allowing me to participate in the care of Marce Moses. Please do not hesitate to contact me with any questions.         This document has been electronically signed by Wesley Cnao MD on November 4, 2024 08:08 EST

## 2024-11-14 ENCOUNTER — HOSPITAL ENCOUNTER (OUTPATIENT)
Dept: RESPIRATORY THERAPY | Facility: HOSPITAL | Age: 54
Discharge: HOME | End: 2024-11-14
Payer: OTHER GOVERNMENT

## 2024-11-14 DIAGNOSIS — K21.9 GASTROESOPHAGEAL REFLUX DISEASE WITHOUT ESOPHAGITIS: ICD-10-CM

## 2024-11-14 DIAGNOSIS — R91.8 LUNG NODULES: ICD-10-CM

## 2024-11-14 PROCEDURE — 94060 EVALUATION OF WHEEZING: CPT

## 2024-11-14 PROCEDURE — 94726 PLETHYSMOGRAPHY LUNG VOLUMES: CPT

## 2024-11-14 PROCEDURE — 94729 DIFFUSING CAPACITY: CPT

## 2024-11-14 RX ORDER — ALBUTEROL SULFATE 0.83 MG/ML
2.5 SOLUTION RESPIRATORY (INHALATION) ONCE
Status: COMPLETED | OUTPATIENT
Start: 2024-11-14 | End: 2024-11-14

## 2024-11-14 RX ADMIN — ALBUTEROL SULFATE 2.5 MG: 2.5 SOLUTION RESPIRATORY (INHALATION) at 14:37

## 2024-11-18 NOTE — PRE-PROCEDURE INSTRUCTIONS
Left a detailed message   Arrival time of 0600.   Must have a  over the age of 18 for transportation home post- procedure.   May have 2 visitors but anyone under the age of 12 must wait in waiting room with an adult.  Education provided on laxative administration; bowel prep to be taken in two doses. Reviewed clear liquid diet for day prior to procedure. Avoid any red or purple color.   No food, gum, mints, cigarettes, tobacco by mouth for 2 hours prior to arriving at the hospital.  Instructed patient to hold any diabetic medications, blood thinners, diuretics, and weight loss injections.   Instructed to take any nebulizer treatments prior to coming the morning of procedure.   Instructed to call back [phone] for receipt of message and any questions.

## 2024-11-20 ENCOUNTER — TELEPHONE (OUTPATIENT)
Dept: GASTROENTEROLOGY | Facility: CLINIC | Age: 54
End: 2024-11-20
Payer: OTHER GOVERNMENT

## 2024-11-26 ENCOUNTER — ANESTHESIA EVENT (OUTPATIENT)
Dept: GASTROENTEROLOGY | Facility: HOSPITAL | Age: 54
End: 2024-11-26
Payer: OTHER GOVERNMENT

## 2024-11-26 RX ORDER — SODIUM CHLORIDE, SODIUM LACTATE, POTASSIUM CHLORIDE, CALCIUM CHLORIDE 600; 310; 30; 20 MG/100ML; MG/100ML; MG/100ML; MG/100ML
30 INJECTION, SOLUTION INTRAVENOUS CONTINUOUS
Status: CANCELLED | OUTPATIENT
Start: 2024-11-27 | End: 2024-11-27

## 2024-11-26 NOTE — ANESTHESIA PREPROCEDURE EVALUATION
Anesthesia Evaluation     Patient summary reviewed and Nursing notes reviewed   NPO Solid Status: > 8 hours  NPO Liquid Status: > 8 hours           Airway   Mallampati: I  TM distance: >3 FB  Neck ROM: full  No difficulty expected  Dental          Pulmonary     breath sounds clear to auscultation  Cardiovascular - normal exam  Exercise tolerance: good (4-7 METS)    ECG reviewed  Rhythm: regular  Rate: normal        Neuro/Psych  (+) headaches, dizziness/light headedness  GI/Hepatic/Renal/Endo    (+) GERD well controlled, hepatitis A, liver disease    Musculoskeletal     Abdominal    Substance History Alcohol use: NEVER.     OB/GYN          Other        ROS/Med Hx Other: ABNORMAL ECG -  Sinus rhythm  Consider  anteroseptal infarct  No previous ECG available for comparison  Electronically Signed By: Austen Walters ([facility]) 2024-08-13 22:46:02  Date and Time of Study:2024-08-12 10:35:31                      Anesthesia Plan    ASA 2     general   total IV anesthesia  (Total IV Anesthesia    Patient understands anesthesia not responsible for dental damage.  )  intravenous induction     Anesthetic plan, risks, benefits, and alternatives have been provided, discussed and informed consent has been obtained with: patient and spouse/significant other.  Pre-procedure education provided  Plan discussed with CRNA.      CODE STATUS:

## 2024-11-27 ENCOUNTER — HOSPITAL ENCOUNTER (OUTPATIENT)
Facility: HOSPITAL | Age: 54
Setting detail: HOSPITAL OUTPATIENT SURGERY
Discharge: HOME | End: 2024-11-27
Attending: INTERNAL MEDICINE | Admitting: INTERNAL MEDICINE
Payer: OTHER GOVERNMENT

## 2024-11-27 ENCOUNTER — ANESTHESIA (OUTPATIENT)
Dept: GASTROENTEROLOGY | Facility: HOSPITAL | Age: 54
End: 2024-11-27
Payer: OTHER GOVERNMENT

## 2024-11-27 VITALS
HEART RATE: 53 BPM | BODY MASS INDEX: 20.56 KG/M2 | SYSTOLIC BLOOD PRESSURE: 100 MMHG | TEMPERATURE: 97.6 F | DIASTOLIC BLOOD PRESSURE: 73 MMHG | OXYGEN SATURATION: 100 % | RESPIRATION RATE: 13 BRPM | WEIGHT: 112.43 LBS

## 2024-11-27 DIAGNOSIS — R93.3 ABNORMAL CT SCAN, COLON: ICD-10-CM

## 2024-11-27 DIAGNOSIS — R10.12 LEFT UPPER QUADRANT ABDOMINAL PAIN: ICD-10-CM

## 2024-11-27 DIAGNOSIS — K59.00 CONSTIPATION, UNSPECIFIED CONSTIPATION TYPE: ICD-10-CM

## 2024-11-27 DIAGNOSIS — R68.81 EARLY SATIETY: ICD-10-CM

## 2024-11-27 DIAGNOSIS — Z80.0 FH: COLON CANCER: ICD-10-CM

## 2024-11-27 DIAGNOSIS — Z86.0101 HISTORY OF ADENOMATOUS POLYP OF COLON: ICD-10-CM

## 2024-11-27 DIAGNOSIS — Z86.19 HISTORY OF HELICOBACTER PYLORI INFECTION: ICD-10-CM

## 2024-11-27 PROCEDURE — 25010000002 PROPOFOL 10 MG/ML EMULSION: Performed by: NURSE ANESTHETIST, CERTIFIED REGISTERED

## 2024-11-27 PROCEDURE — 43239 EGD BIOPSY SINGLE/MULTIPLE: CPT | Performed by: INTERNAL MEDICINE

## 2024-11-27 PROCEDURE — 88305 TISSUE EXAM BY PATHOLOGIST: CPT | Performed by: INTERNAL MEDICINE

## 2024-11-27 PROCEDURE — 45385 COLONOSCOPY W/LESION REMOVAL: CPT | Performed by: INTERNAL MEDICINE

## 2024-11-27 PROCEDURE — 25010000002 LIDOCAINE PF 2% 2 % SOLUTION: Performed by: NURSE ANESTHETIST, CERTIFIED REGISTERED

## 2024-11-27 RX ORDER — LIDOCAINE HYDROCHLORIDE 20 MG/ML
INJECTION, SOLUTION EPIDURAL; INFILTRATION; INTRACAUDAL; PERINEURAL AS NEEDED
Status: DISCONTINUED | OUTPATIENT
Start: 2024-11-27 | End: 2024-11-27 | Stop reason: SURG

## 2024-11-27 RX ORDER — SODIUM CHLORIDE, SODIUM LACTATE, POTASSIUM CHLORIDE, CALCIUM CHLORIDE 600; 310; 30; 20 MG/100ML; MG/100ML; MG/100ML; MG/100ML
30 INJECTION, SOLUTION INTRAVENOUS CONTINUOUS
Status: DISCONTINUED | OUTPATIENT
Start: 2024-11-27 | End: 2024-11-27 | Stop reason: HOSPADM

## 2024-11-27 RX ORDER — PROPOFOL 10 MG/ML
VIAL (ML) INTRAVENOUS AS NEEDED
Status: DISCONTINUED | OUTPATIENT
Start: 2024-11-27 | End: 2024-11-27 | Stop reason: SURG

## 2024-11-27 RX ADMIN — PROPOFOL 200 MCG/KG/MIN: 10 INJECTION, EMULSION INTRAVENOUS at 07:29

## 2024-11-27 RX ADMIN — LIDOCAINE HYDROCHLORIDE 80 MG: 20 INJECTION, SOLUTION EPIDURAL; INFILTRATION; INTRACAUDAL; PERINEURAL at 07:29

## 2024-11-27 RX ADMIN — PROPOFOL 100 MG: 10 INJECTION, EMULSION INTRAVENOUS at 07:29

## 2024-11-27 NOTE — ANESTHESIA POSTPROCEDURE EVALUATION
Patient: Marce Moses    Procedure Summary       Date: 11/27/24 Room / Location: [facility] ENDOSCOPY 3 / [facility] ENDOSCOPY    Anesthesia Start: 0727 Anesthesia Stop: 0800    Procedures:       ESOPHAGOGASTRODUODENOSCOPY WITH BIOPSIES      COLONOSCOPY WITH POLYPECTOMY Diagnosis:       Left upper quadrant abdominal pain      Abnormal CT scan, colon      Early satiety      Constipation, unspecified constipation type      History of Helicobacter pylori infection      History of adenomatous polyp of colon      FH: colon cancer      (Left upper quadrant abdominal pain [R10.12])      (Abnormal CT scan, colon [R93.3])      (Early satiety [R68.81])      (Constipation, unspecified constipation type [K59.00])      (History of Helicobacter pylori infection [Z86.19])      (History of adenomatous polyp of colon [Z86.0101])      (FH: colon cancer [Z80.0])    Surgeons: Edie Medel MD Provider: Jermain Montanez CRNA    Anesthesia Type: general ASA Status: 2            Anesthesia Type: general    Vitals  Vitals Value Taken Time   /73 11/27/24 0815   Temp 36.4 °C (97.6 °F) 11/27/24 0815   Pulse 63 11/27/24 0816   Resp 13 11/27/24 0815   SpO2 100 % 11/27/24 0816   Vitals shown include unfiled device data.        Post Anesthesia Care and Evaluation    Post-procedure mental status: acceptable.  Pain management: satisfactory to patient    Airway patency: patent  Anesthetic complications: No anesthetic complications    Cardiovascular status: acceptable  Respiratory status: acceptable    Comments: Per chart review

## 2024-11-27 NOTE — H&P
Pre Procedure History & Physical    Chief Complaint:   LUQ pain, constipation, abnormal CT    Subjective     HPI:   55 yo F here for eval of LUQ pain, constipation, abnormal CT.    Past Medical History:   Past Medical History:   Diagnosis Date    Allergy 1994    GERD (gastroesophageal reflux disease) 01/14/2020    Helicobacter pylori infection 01/14/2020    Hepatitis A 1996    Migraine     Ovarian cyst     TMJ syndrome 1999    Upset stomach     Vertigo 02/17/2020       Past Surgical History:  Past Surgical History:   Procedure Laterality Date    COLONOSCOPY  2020    polyps removed    TEMPOROMANDIBULAR JOINT SURGERY  1999    UPPER GASTROINTESTINAL ENDOSCOPY  2020       Family History:  Family History   Problem Relation Age of Onset    Colon cancer Mother         mom    Stroke Father     Heart disease Father     Colon cancer Maternal Grandmother         G-grandma    Heart disease Maternal Grandmother     Heart disease Other     Uterine cancer Other     Heart disease Other        Social History:   reports that she has never smoked. She has never used smokeless tobacco. She reports that she does not drink alcohol and does not use drugs.    Medications:   Facility-Administered Medications Prior to Admission   Medication Dose Route Frequency Provider Last Rate Last Admin    famotidine (PEPCID) tablet 20 mg  20 mg Oral BID Wesley Anderson MD         Medications Prior to Admission   Medication Sig Dispense Refill Last Dose/Taking    atorvastatin (LIPITOR) 10 MG tablet Take 1 tablet by mouth Daily. (Patient not taking: Reported on 11/4/2024) 90 tablet 1     azelaic acid (AZELEX) 15 % gel  (Patient not taking: Reported on 11/4/2024)       buPROPion SR (Wellbutrin SR) 150 MG 12 hr tablet Take 1 tablet by mouth 2 (Two) Times a Day. (Patient not taking: Reported on 11/4/2024) 60 tablet 1     calcium carbonate (OS-KRUNAL) 1250 (500 Ca) MG tablet Take 1 tablet by mouth Daily. (Patient not taking: Reported on 11/4/2024) 90 tablet  1     desonide (DESOWEN) 0.05 % cream APPLY TOPICALLY TO THE AFFECTED AREA TWICE DAILY AS NEEDED FOR ITCHY SKIN (Patient not taking: Reported on 11/4/2024)       doxycycline (VIBRAMYCIN) 100 MG capsule  (Patient not taking: Reported on 11/4/2024)       naltrexone (DEPADE) 50 MG tablet Take 0.5 tablets by mouth Daily. (Patient not taking: Reported on 11/4/2024) 15 tablet 1     rizatriptan MLT (Maxalt-MLT) 10 MG disintegrating tablet Place 1 tablet on the tongue 1 (One) Time As Needed for Migraine. May repeat in 2 hours if needed (Patient not taking: Reported on 11/4/2024) 27 tablet 1     Tranexamic Acid 650 MG tablet  (Patient not taking: Reported on 11/4/2024)       triamcinolone (KENALOG) 0.1 % cream Apply 1 Application topically to the appropriate area as directed 2 (Two) Times a Day. (Patient not taking: Reported on 11/4/2024) 45 g 1     vitamin D (ERGOCALCIFEROL) 1.25 MG (47867 UT) capsule capsule Take 1 capsule by mouth 1 (One) Time Per Week. (Patient not taking: Reported on 11/4/2024) 13 capsule 1     Vitamin D, Cholecalciferol, 25 MCG (1000 UT) capsule Take 1 capsule by mouth Daily. (Patient not taking: Reported on 11/4/2024) 90 capsule 1     vitamin E 100 UNIT capsule Take 1 capsule by mouth Daily. (Patient not taking: Reported on 11/4/2024)          Allergies:  Shellfish-derived products    ROS:    Pertinent items are noted in HPI     Objective     Blood pressure 123/82, pulse 64, temperature 97.8 °F (36.6 °C), temperature source Temporal, resp. rate 16, weight 51 kg (112 lb 7 oz), SpO2 99%, not currently breastfeeding.    Physical Exam   Constitutional: Pt is oriented to person, place, and time and well-developed, well-nourished, and in no distress.   Mouth/Throat: Oropharynx is clear and moist.   Neck: Normal range of motion.   Cardiovascular: Normal rate, regular rhythm and normal heart sounds.    Pulmonary/Chest: Effort normal and breath sounds normal.   Abdominal: Soft. Nontender  Skin: Skin is warm  and dry.   Psychiatric: Mood, memory, affect and judgment normal.     Assessment & Plan     Diagnosis:  LUQ pain, constipation, abnormal CT    Anticipated Surgical Procedure:  EGD/colonoscopy    The risks, benefits, and alternatives of this procedure have been discussed with the patient or the responsible party- the patient understands and agrees to proceed.

## 2024-12-02 LAB
CYTO UR: NORMAL
LAB AP CASE REPORT: NORMAL
LAB AP CLINICAL INFORMATION: NORMAL
PATH REPORT.FINAL DX SPEC: NORMAL
PATH REPORT.GROSS SPEC: NORMAL

## 2024-12-04 ENCOUNTER — TELEPHONE (OUTPATIENT)
Dept: GASTROENTEROLOGY | Facility: CLINIC | Age: 54
End: 2024-12-04
Payer: OTHER GOVERNMENT

## 2024-12-04 NOTE — TELEPHONE ENCOUNTER
----- Message from Janey Muhammad sent at 12/2/2024 12:55 PM EST -----  Colon biopsies benign.  Repeat colonoscopy in 5 years.  Please send letter to patient and PCP.  EGD biopsies benign.  If the patient is still having symptoms Dr. Medel recommended right upper quadrant abdominal ultrasound.  Would have patient follow-up with me in the office in the next 6 to 8 weeks if still having symptoms.  I can order the ultrasound if the patient is still having symptoms.  Thanks

## 2024-12-05 DIAGNOSIS — R10.11 RUQ ABDOMINAL PAIN: Primary | ICD-10-CM

## 2024-12-24 ENCOUNTER — HOSPITAL ENCOUNTER (OUTPATIENT)
Dept: ULTRASOUND IMAGING | Facility: HOSPITAL | Age: 54
Discharge: HOME | End: 2024-12-24
Admitting: NURSE PRACTITIONER
Payer: OTHER GOVERNMENT

## 2024-12-24 DIAGNOSIS — R10.11 RUQ ABDOMINAL PAIN: ICD-10-CM

## 2024-12-24 PROCEDURE — 76705 ECHO EXAM OF ABDOMEN: CPT

## 2024-12-27 ENCOUNTER — TELEPHONE (OUTPATIENT)
Dept: GASTROENTEROLOGY | Facility: CLINIC | Age: 54
End: 2024-12-27
Payer: OTHER GOVERNMENT

## 2024-12-27 NOTE — TELEPHONE ENCOUNTER
Patient was notified of her results and verbalized understanding. Patient stated that she is going to follow up with PCM as needed and declined a follow up with Janey at this time. Patient is not having symptoms.

## 2024-12-27 NOTE — TELEPHONE ENCOUNTER
----- Message from Janey Muhammad sent at 12/27/2024  8:32 AM EST -----  Us looks good. A small liver cyst noted. Otherwise stable.

## 2025-02-17 ENCOUNTER — OFFICE VISIT (OUTPATIENT)
Dept: PULMONOLOGY | Facility: CLINIC | Age: 55
End: 2025-02-17
Payer: OTHER GOVERNMENT

## 2025-02-17 VITALS
OXYGEN SATURATION: 99 % | HEIGHT: 62 IN | TEMPERATURE: 98.2 F | BODY MASS INDEX: 20.98 KG/M2 | WEIGHT: 114 LBS | DIASTOLIC BLOOD PRESSURE: 83 MMHG | RESPIRATION RATE: 16 BRPM | HEART RATE: 60 BPM | SYSTOLIC BLOOD PRESSURE: 134 MMHG

## 2025-02-17 DIAGNOSIS — K21.9 GASTROESOPHAGEAL REFLUX DISEASE WITHOUT ESOPHAGITIS: Primary | ICD-10-CM

## 2025-02-17 DIAGNOSIS — R91.8 LUNG NODULES: ICD-10-CM

## 2025-02-17 PROCEDURE — 99214 OFFICE O/P EST MOD 30 MIN: CPT | Performed by: INTERNAL MEDICINE

## 2025-02-17 RX ORDER — PREDNISOLONE ACETATE 10 MG/ML
1 SUSPENSION/ DROPS OPHTHALMIC 4 TIMES DAILY
COMMUNITY
Start: 2025-02-08

## 2025-02-17 NOTE — PROGRESS NOTES
Pulmonary Office Follow-up    Subjective     Marce Moses is seen today at the office for   Chief Complaint   Patient presents with    Follow-up     3 month  Lung nodule    Shortness of Breath         HPI  Marce Moses is a 54 y.o. female with a PMH significant for chronic cough along with gastroesophageal reflux disease and lung nodules presents for follow-up patient has been doing well she does not smoke and her cough has improved she continues to have some constipation but denies any chest pain fever hemoptysis weight loss or night sweats      Tobacco use history:  Never smoker      Patient Active Problem List   Diagnosis    GERD (gastroesophageal reflux disease)    Helicobacter pylori gastrointestinal tract infection    Hepatitis A    Migraine    Ovarian cyst    Cervical disc herniation    TMJ syndrome    Jaw pain    Bilateral temporomandibular joint pain    Skin lesions, generalized    Left upper quadrant abdominal pain    Abnormal CT scan, colon    Early satiety    Constipation    History of Helicobacter pylori infection    History of adenomatous polyp of colon    FH: colon cancer       Review of Systems  Review of Systems   All other systems reviewed and are negative.    As described in the HPI. Otherwise, remainder of ROS (14 systems) were negative.    Medications, Allergies, Social, and Family Histories reviewed as per EMR.    Result Review :            Objective     Vitals:    02/17/25 0927   BP: 134/83   Pulse: 60   Resp: 16   Temp: 98.2 °F (36.8 °C)   SpO2: 99%         02/17/25 0927   Weight: 51.7 kg (114 lb)       Physical Exam  Vitals and nursing note reviewed.   Constitutional:       Appearance: Normal appearance.   HENT:      Head: Normocephalic and atraumatic.      Nose: Nose normal.      Mouth/Throat:      Mouth: Mucous membranes are moist.      Pharynx: Oropharynx is clear.   Eyes:      Extraocular Movements: Extraocular movements intact.      Conjunctiva/sclera: Conjunctivae normal.    Cardiovascular:      Rate and Rhythm: Normal rate and regular rhythm.      Pulses: Normal pulses.      Heart sounds: Normal heart sounds.   Pulmonary:      Effort: Pulmonary effort is normal.      Breath sounds: Normal breath sounds.   Musculoskeletal:         General: Normal range of motion.      Cervical back: Normal range of motion and neck supple.   Skin:     General: Skin is warm.      Capillary Refill: Capillary refill takes 2 to 3 seconds.   Neurological:      Mental Status: She is alert and oriented to person, place, and time.   Psychiatric:         Mood and Affect: Mood normal.         Behavior: Behavior normal.         US Gallbladder    Result Date: 12/24/2024  Impression: 1.No evidence of cholelithiasis or acute cholecystitis. 2.Subcentimeter hepatic cyst. Electronically Signed: Johnathan Singh MD  12/24/2024 2:09 PM EST  Workstation ID: [id number]      Assessment & Plan     Diagnoses and all orders for this visit:    1. Gastroesophageal reflux disease without esophagitis (Primary)  -     Cancel: CT Chest Low Dose Follow Up With Contrast; Future  -     CT Chest Low Dose Follow Up With Contrast; Future    2. Lung nodules  -     Cancel: CT Chest Low Dose Follow Up With Contrast; Future  -     CT Chest Low Dose Follow Up With Contrast; Future         Discussion/ Recommendations:   Patient is advised repeat CT chest later this year for follow-up of lung nodules  She was reassured  Continue home medications  Vaccinations discussed and recommended    BMI is within normal parameters. No other follow-up for BMI required.        Return in about 1 year (around 2/17/2026).          This document has been electronically signed by Wesley Cano MD on February 17, 2025 09:40 EST

## 2025-03-10 ENCOUNTER — OFFICE VISIT (OUTPATIENT)
Dept: FAMILY MEDICINE CLINIC | Facility: CLINIC | Age: 55
End: 2025-03-10
Payer: OTHER GOVERNMENT

## 2025-03-10 VITALS
TEMPERATURE: 98.3 F | SYSTOLIC BLOOD PRESSURE: 114 MMHG | BODY MASS INDEX: 21.07 KG/M2 | OXYGEN SATURATION: 98 % | DIASTOLIC BLOOD PRESSURE: 74 MMHG | HEART RATE: 70 BPM | HEIGHT: 62 IN | WEIGHT: 114.5 LBS

## 2025-03-10 DIAGNOSIS — R91.8 PULMONARY NODULES: ICD-10-CM

## 2025-03-10 DIAGNOSIS — E55.9 VITAMIN D DEFICIENCY: ICD-10-CM

## 2025-03-10 DIAGNOSIS — R07.89 LEFT-SIDED CHEST WALL PAIN: Primary | ICD-10-CM

## 2025-03-10 DIAGNOSIS — R43.8 METALLIC TASTE: ICD-10-CM

## 2025-03-10 RX ORDER — FAMOTIDINE 20 MG/1
20 TABLET, FILM COATED ORAL 2 TIMES DAILY
COMMUNITY

## 2025-03-10 NOTE — PROGRESS NOTES
Chief Complaint  Abdominal Pain    Sonali Moses is a 54 y.o. female who presents to [facility]     History of Present Illness  The patient is a 54-year-old female who presents today with a complaint of pain in the left side of her chest.    She reports that the pain has been under her breast for over a week and close to under her arm. The pain is present all the time and it hurts more with movement. She has also noted a metallic taste in her mouth for approximately the same amount of time. She has been experiencing persistent pain localized under her left breast, adjacent to the rib cage, for over a week. The pain intensifies with movement, such as coughing or fastening a seatbelt. She reports no recent injury or strenuous activity that could have potentially aggravated the area. She has not been experiencing any recent coughs. Her appetite has slightly decreased. She has been managing the pain with Pepcid without improvement but expresses apprehension about using other medications due to a previous diagnosis of colitis. She reports no external signs of inflammation such as redness or swelling. She has been informed that her symptoms may be due to a pulled muscle. She has been experiencing alternating episodes of constipation and diarrhea and has been using a stool softener, docusate sodium/senna for management. She reports increased urinary frequency with water intake.    She has been experiencing an intermittent metallic taste in her mouth for approximately the same duration as her chest pain.    She has a history of vitamin D deficiency and was previously on a high dose of vitamin D and calcium, but she is not currently taking these supplements. She has a sweet tooth and has tried Wellbutrin and naltrexone to curb her cravings, but they were ineffective. She has a history of allergies, a colon polyp, acid reflux, and H. pylori infection diagnosed in 2020. She  "underwent an upper GI in December, which was negative for H. pylori. She had a colonoscopy with no issues. A CT scan of her chest revealed a small scar from old pneumonia. She spent 10 days in the hospital for hepatitis when she was 24 or 25 years old. She has always had a high fever before her H. pylori or colitis episodes, but this time she has no fever. She had a DEXA scan in 2023, which showed no signs of osteopenia or osteoporosis. Her last mammogram was in April 2022 and was normal. She had a follow-up bilateral screening mammogram in August 2022, which was also normal. She had another mammogram in 2023, which was normal.    Supplemental Information  She is taking eyedrops for cataracts. She restarted the Pepcid because when she went to the ER back last August, they diagnosed her with colitis and gave her 5 different medicines. She felt like the Pepcid helped the most, so she started it again. She restarted that recently thinking since it is in the same area there may be a connection, but she did not notice any improvement. She has been taking a stool softener due to constipation.    ALLERGIES  - Allergic to BUFFERIN    MEDICATIONS  - Current:     - Pepcid    - Eyedrops  - Discontinued:     - Wellbutrin    - Naltrexone    - Vitamin D    - Calcium        Patient Care Team:  Adolfo Singh APRN as PCP - General (Nurse Practitioner)  Wesley Cano MD as Consulting Physician (Pulmonary Disease)  Janey Muhammad APRN as Nurse Practitioner (Nurse Practitioner)    Objective   Vital Signs:   Vitals:    03/10/25 1553   BP: 114/74   Pulse: 70   Temp: 98.3 °F (36.8 °C)   SpO2: 98%   Weight: 51.9 kg (114 lb 8 oz)   Height: 157.5 cm (62\")     Body mass index is 20.94 kg/m².    Wt Readings from Last 3 Encounters:   03/10/25 51.9 kg (114 lb 8 oz)   02/17/25 51.7 kg (114 lb)   11/27/24 51 kg (112 lb 7 oz)     BP Readings from Last 3 Encounters:   03/10/25 114/74   02/17/25 134/83   11/27/24 100/73 "       Health Maintenance   Topic Date Due    ANNUAL PHYSICAL  02/02/2025    INFLUENZA VACCINE  03/31/2025 (Originally 7/1/2024)    COVID-19 Vaccine (5 - 2024-25 season) 05/09/2025 (Originally 9/1/2024)    TDAP/TD VACCINES (1 - Tdap) 06/07/2025 (Originally 6/14/1989)    ZOSTER VACCINE (1 of 2) 06/07/2025 (Originally 6/14/2020)    Pneumococcal Vaccine 50+ (1 of 1 - PCV) 02/17/2026 (Originally 6/14/2020)    LIPID PANEL  06/05/2025    MAMMOGRAM  12/08/2025    PAP SMEAR  07/25/2027    COLORECTAL CANCER SCREENING  11/27/2029    HEPATITIS C SCREENING  Completed       Lab Results (last 24 hours)       ** No results found for the last 24 hours. **               Physical Exam  Vitals and nursing note reviewed.   Constitutional:       General: She is not in acute distress.     Appearance: Normal appearance. She is not ill-appearing.   HENT:      Head: Normocephalic and atraumatic.   Eyes:      Extraocular Movements: Extraocular movements intact.      Conjunctiva/sclera: Conjunctivae normal.   Cardiovascular:      Rate and Rhythm: Normal rate and regular rhythm.      Heart sounds: Normal heart sounds.   Pulmonary:      Effort: Pulmonary effort is normal.      Breath sounds: Normal breath sounds.   Chest:       Skin:     General: Skin is warm and dry.   Neurological:      General: No focal deficit present.      Mental Status: She is alert and oriented to person, place, and time.   Psychiatric:         Mood and Affect: Mood normal.         Behavior: Behavior normal.          Physical Exam        Result Review   The following data was reviewed by: Liyah Serrano MD on 03/10/2025:  [x]  Tests & Results  []  Hospitalization/Emergency Department/Urgent Care  [x]  Internal/External Consultant Notes    Results  - Imaging:    - CT scan of chest:      - 4 mm noncalcified nodule in the left lower lobe      - 6 mm subpleural nodule in the right lower lobe      - 2 mm nodule of the lingula      - No lung infiltrates observed      -  Tiny hepatic cyst noted      - No signs of gallstones      Procedures          ASSESSMENT/PLAN  Diagnoses and all orders for this visit:    1. Left-sided chest wall pain (Primary)  -     Cancel: CBC & Differential  -     Cancel: Comprehensive metabolic panel  -     CT Chest With Contrast; Future  -     CBC & Differential; Future  -     Comprehensive metabolic panel; Future    2. Pulmonary nodules  -     CT Chest With Contrast; Future    3. Vitamin D deficiency  -     Cancel: Vitamin D,25-Hydroxy  -     Vitamin D,25-Hydroxy; Future    4. Metallic taste  -     Iron Profile; Future        Assessment & Plan  1. Left-sided chest pain.  The pain could potentially be attributed to costochondritis, given its location at the junction of the rib bone and cartilage. However, the rotation of the pain towards the back and its extensive coverage suggest that it may be due to a muscle strain involving the entire rib. This hypothesis is further supported by the fact that the pain intensifies during deep inhalation or vigorous coughing, indicative of muscle tension on the rib. The possibility of an underlying rib injury, bruise, or fracture can not be ruled out, even in the absence of a known trauma. It is also plausible that the pain could be a result of thin bones, which can lead to rib fractures from minor movements or deep breaths. The presence of pulmonary nodules (including one along the Left lung) necessitates a CT scan for further evaluation.  The application of ice to the affected area is recommended to alleviate inflammation. A CT scan of the chest with contrast will be ordered to investigate the cause of the pain. In the interim, she is advised to take ibuprofen 200 mg every 4 to 6 hours, ensuring that it is taken with food. If the ibuprofen completely alleviates the pain, the CT scan can be deferred until the scheduled follow-up in September 2025.    2. Metallic taste in mouth.  The metallic taste could be a symptom of a  deficiency causing bone pain, such as iron deficiency. Blood work will be conducted to assess for potential deficiencies, including a vitamin D level, CBC with differential, and CMP. If a deficiency is identified, appropriate supplementation will be recommended.    3. Health maintenance.  She had a DEXA scan in 2023, which showed no signs of osteopenia or osteoporosis. Her last mammogram was in April 2022 and was normal. She had a follow-up bilateral screening mammogram in August 2022, which was also normal. She had another mammogram in 2023, which was normal.    4. Chronic intermittent constipation  She is advised to discontinue Pepcid due to lack of improvement. She is advised to discontinue ELI stool softener. She is also encouraged to increase her water intake and consider using Metamucil for underlying constipation history.    PROCEDURE  The patient underwent a colonoscopy in the past, which showed no issues.        BMI is within normal parameters. No other follow-up for BMI required.       Marce Moses  reports that she has never smoked. She has never used smokeless tobacco.        I spent 30 minutes caring for Marce on this date of service. This time includes time spent by me in the following activities:preparing for the visit, reviewing tests, obtaining and/or reviewing a separately obtained history, performing a medically appropriate examination and/or evaluation , counseling and educating the patient/family/caregiver, ordering medications, tests, or procedures, referring and communicating with other health care professionals , and documenting information in the medical record    FOLLOW UP  Return if symptoms worsen or fail to improve.  Patient was given instructions and counseling regarding her condition or for health maintenance advice. Please see specific information pulled into the AVS if appropriate.       Liyah Serrano MD  03/10/25  18:10 EDT    Part of this note may be an electronic  transcription/translation of spoken language to printed text using the Dragon Dictation System.    Patient or patient representative verbalized consent for the use of Ambient Listening during the visit with  Liyah Serrano MD for chart documentation. 3/10/2025  18:09 EDT

## 2025-03-11 ENCOUNTER — CLINICAL SUPPORT (OUTPATIENT)
Dept: FAMILY MEDICINE CLINIC | Facility: CLINIC | Age: 55
End: 2025-03-11
Payer: OTHER GOVERNMENT

## 2025-03-11 DIAGNOSIS — E55.9 VITAMIN D DEFICIENCY: ICD-10-CM

## 2025-03-11 DIAGNOSIS — R43.8 METALLIC TASTE: ICD-10-CM

## 2025-03-11 DIAGNOSIS — R07.89 LEFT-SIDED CHEST WALL PAIN: ICD-10-CM

## 2025-03-11 LAB
25(OH)D3 SERPL-MCNC: 10.2 NG/ML (ref 30–100)
ALBUMIN SERPL-MCNC: 4.1 G/DL (ref 3.5–5.2)
ALBUMIN/GLOB SERPL: 1.3 G/DL
ALP SERPL-CCNC: 81 U/L (ref 39–117)
ALT SERPL W P-5'-P-CCNC: 13 U/L (ref 1–33)
ANION GAP SERPL CALCULATED.3IONS-SCNC: 8 MMOL/L (ref 5–15)
AST SERPL-CCNC: 22 U/L (ref 1–32)
BASOPHILS # BLD AUTO: 0.04 10*3/MM3 (ref 0–0.2)
BASOPHILS NFR BLD AUTO: 0.9 % (ref 0–1.5)
BILIRUB SERPL-MCNC: <0.2 MG/DL (ref 0–1.2)
BUN SERPL-MCNC: 17 MG/DL (ref 6–20)
BUN/CREAT SERPL: 24.3 (ref 7–25)
CALCIUM SPEC-SCNC: 9.1 MG/DL (ref 8.6–10.5)
CHLORIDE SERPL-SCNC: 104 MMOL/L (ref 98–107)
CO2 SERPL-SCNC: 28 MMOL/L (ref 22–29)
CREAT SERPL-MCNC: 0.7 MG/DL (ref 0.57–1)
DEPRECATED RDW RBC AUTO: 40.4 FL (ref 37–54)
EGFRCR SERPLBLD CKD-EPI 2021: 102.9 ML/MIN/1.73
EOSINOPHIL # BLD AUTO: 0.05 10*3/MM3 (ref 0–0.4)
EOSINOPHIL NFR BLD AUTO: 1.1 % (ref 0.3–6.2)
ERYTHROCYTE [DISTWIDTH] IN BLOOD BY AUTOMATED COUNT: 11.8 % (ref 12.3–15.4)
GLOBULIN UR ELPH-MCNC: 3.2 GM/DL
GLUCOSE SERPL-MCNC: 94 MG/DL (ref 65–99)
HCT VFR BLD AUTO: 38.5 % (ref 34–46.6)
HGB BLD-MCNC: 12.7 G/DL (ref 12–15.9)
IMM GRANULOCYTES # BLD AUTO: 0.01 10*3/MM3 (ref 0–0.05)
IMM GRANULOCYTES NFR BLD AUTO: 0.2 % (ref 0–0.5)
IRON 24H UR-MRATE: 94 MCG/DL (ref 37–145)
IRON SATN MFR SERPL: 24 % (ref 20–50)
LYMPHOCYTES # BLD AUTO: 1.17 10*3/MM3 (ref 0.7–3.1)
LYMPHOCYTES NFR BLD AUTO: 26.8 % (ref 19.6–45.3)
MCH RBC QN AUTO: 30.8 PG (ref 26.6–33)
MCHC RBC AUTO-ENTMCNC: 33 G/DL (ref 31.5–35.7)
MCV RBC AUTO: 93.4 FL (ref 79–97)
MONOCYTES # BLD AUTO: 0.2 10*3/MM3 (ref 0.1–0.9)
MONOCYTES NFR BLD AUTO: 4.6 % (ref 5–12)
NEUTROPHILS NFR BLD AUTO: 2.89 10*3/MM3 (ref 1.7–7)
NEUTROPHILS NFR BLD AUTO: 66.4 % (ref 42.7–76)
NRBC BLD AUTO-RTO: 0 /100 WBC (ref 0–0.2)
PLATELET # BLD AUTO: 270 10*3/MM3 (ref 140–450)
PMV BLD AUTO: 9.6 FL (ref 6–12)
POTASSIUM SERPL-SCNC: 4.9 MMOL/L (ref 3.5–5.2)
PROT SERPL-MCNC: 7.3 G/DL (ref 6–8.5)
RBC # BLD AUTO: 4.12 10*6/MM3 (ref 3.77–5.28)
SODIUM SERPL-SCNC: 140 MMOL/L (ref 136–145)
TIBC SERPL-MCNC: 384 MCG/DL (ref 298–536)
TRANSFERRIN SERPL-MCNC: 258 MG/DL (ref 200–360)
WBC NRBC COR # BLD AUTO: 4.36 10*3/MM3 (ref 3.4–10.8)

## 2025-03-11 PROCEDURE — 36415 COLL VENOUS BLD VENIPUNCTURE: CPT | Performed by: NURSE PRACTITIONER

## 2025-03-11 PROCEDURE — 82306 VITAMIN D 25 HYDROXY: CPT | Performed by: FAMILY MEDICINE

## 2025-03-11 PROCEDURE — 80053 COMPREHEN METABOLIC PANEL: CPT | Performed by: FAMILY MEDICINE

## 2025-03-11 PROCEDURE — 85025 COMPLETE CBC W/AUTO DIFF WBC: CPT | Performed by: FAMILY MEDICINE

## 2025-03-11 PROCEDURE — 84466 ASSAY OF TRANSFERRIN: CPT | Performed by: FAMILY MEDICINE

## 2025-03-11 PROCEDURE — 83540 ASSAY OF IRON: CPT | Performed by: FAMILY MEDICINE

## 2025-03-24 ENCOUNTER — HOSPITAL ENCOUNTER (OUTPATIENT)
Dept: CT IMAGING | Facility: HOSPITAL | Age: 55
Discharge: HOME | End: 2025-03-24
Admitting: FAMILY MEDICINE
Payer: OTHER GOVERNMENT

## 2025-03-24 DIAGNOSIS — R07.89 LEFT-SIDED CHEST WALL PAIN: ICD-10-CM

## 2025-03-24 DIAGNOSIS — R91.8 PULMONARY NODULES: ICD-10-CM

## 2025-03-24 PROCEDURE — 25510000001 IOPAMIDOL PER 1 ML: Performed by: FAMILY MEDICINE

## 2025-03-24 PROCEDURE — 71260 CT THORAX DX C+: CPT

## 2025-03-24 RX ORDER — IOPAMIDOL 755 MG/ML
100 INJECTION, SOLUTION INTRAVASCULAR
Status: COMPLETED | OUTPATIENT
Start: 2025-03-24 | End: 2025-03-24

## 2025-03-24 RX ADMIN — IOPAMIDOL 75 ML: 755 INJECTION, SOLUTION INTRAVENOUS at 15:27

## 2025-03-27 ENCOUNTER — RESULTS FOLLOW-UP (OUTPATIENT)
Dept: CT IMAGING | Facility: HOSPITAL | Age: 55
End: 2025-03-27
Payer: OTHER GOVERNMENT

## 2025-03-27 RX ORDER — ERGOCALCIFEROL 1.25 MG/1
50000 CAPSULE, LIQUID FILLED ORAL WEEKLY
Qty: 12 CAPSULE | Refills: 0 | Status: SHIPPED | OUTPATIENT
Start: 2025-03-27

## 2025-03-27 NOTE — PROGRESS NOTES
CT showed some small nodules that may be lymph nodes. Continue to monitor with a follow up image in 6 months for comparison.

## 2025-03-27 NOTE — LETTER
Marce Moses  [address] [facility] [place] [address]    March 28, 2025     Dear Ms. Moses:    Below are the results from your recent visit:    Resulted Orders   CT Chest With Contrast Diagnostic    Narrative    CT CHEST W CONTRAST DIAGNOSTIC    Date of Exam: 3/24/2025 3:25 PM EDT    Indication: left chest wall pain anterolateral region with h/o pulmonary nodules.    Comparison: CT chest October 7, 2024    Technique: Axial CT images were obtained of the chest after the uneventful intravenous administration of iodinated contrast.  Reconstructed coronal and sagittal images were also obtained. Automated exposure control and iterative construction methods were   used.      Findings:  There is a pleural-based pulmonary nodule measuring 2.8 mm on image 52 series 205 which has been suggested and may relate to intrapulmonary lymph node. There additionally is a small pulmonary nodule along the fissure in the left lower chest measuring 2.5   mm on image 54 series 205 and small pulmonary nodule left lower lobe measuring 3.7 mm on image 50 series 205 that also may relate to intrapulmonary lymph nodes. There is no juan manuel consolidation. There are no pleural effusions.    No pathologic-appearing mediastinal or hilar lymphadenopathy is seen.    Lower slices through the upper an reveal small hypodense area within the right lobe the liver that could reflect cyst or hemangioma.    There are some degenerative changes involving thoracic spine.      Impression    Impression:  1.There are small pulmonary nodules  that may relate to intrapulmonary lymph nodes.  2.Small hypodense area within the right lobe the liver that could reflect cyst or hemangioma.  3.There are some degenerative changes involving the thoracic spine.        Electronically Signed: Francis Bentley MD    3/27/2025 8:43 AM EDT    Workstation ID: [id number]       CT showed some small nodules that may be lymph nodes. Continue to monitor with a follow up image in 6 months for  comparison.          If you have any questions or concerns, please don't hesitate to call.         Sincerely,        Liyah Serrano MD

## 2025-03-31 ENCOUNTER — TELEPHONE (OUTPATIENT)
Dept: FAMILY MEDICINE CLINIC | Facility: CLINIC | Age: 55
End: 2025-03-31
Payer: OTHER GOVERNMENT

## 2025-03-31 RX ORDER — MAGNESIUM GLYCINATE 100 MG
1 CAPSULE ORAL DAILY
Qty: 30 CAPSULE | Refills: 2 | Status: SHIPPED | OUTPATIENT
Start: 2025-03-31

## 2025-03-31 NOTE — TELEPHONE ENCOUNTER
PT WOULD LIKE D3 RX SENT IN TO [facility]. PT HAS VIT D2 RX BUT WANTS IT BUMPED TO VIT D3  ALSO WOULD LIKE A RX FOR VIT K2

## 2025-04-02 ENCOUNTER — TELEPHONE (OUTPATIENT)
Dept: FAMILY MEDICINE CLINIC | Facility: CLINIC | Age: 55
End: 2025-04-02

## 2025-04-02 ENCOUNTER — TELEPHONE (OUTPATIENT)
Dept: FAMILY MEDICINE CLINIC | Facility: CLINIC | Age: 55
End: 2025-04-02
Payer: OTHER GOVERNMENT

## 2025-04-02 NOTE — TELEPHONE ENCOUNTER
PT PREVIOUS REQUESTED D3 AND K2. PT RECEIVED THE PILL THAT HAD BOTH IN IT BUT WANTS RX FOR CHOLECALCIFEROL 25MG [id number] TABLETS OR [id number] CAPS AS RECOMMENDED BY PHARMACIST

## 2025-04-17 ENCOUNTER — TELEPHONE (OUTPATIENT)
Dept: FAMILY MEDICINE CLINIC | Facility: CLINIC | Age: 55
End: 2025-04-17
Payer: OTHER GOVERNMENT

## 2025-04-17 NOTE — TELEPHONE ENCOUNTER
Caller:   YONIS DE LA TORRE     Relationship:   SELF     Best call back number:  [phone]       Who are you requesting to speak with (clinical staff, provider,  specific staff member): CLINICAL     What was the call regarding:  Patient dropped off paperwork from [facility] pharmacy explaining exactly what there were needing for her to  her Vit D3 + K2  Please call and advise Pharmacy and or patient.

## 2025-08-21 ENCOUNTER — HOSPITAL ENCOUNTER (OUTPATIENT)
Dept: CT IMAGING | Facility: HOSPITAL | Age: 55
Discharge: HOME | End: 2025-08-21
Admitting: INTERNAL MEDICINE
Payer: OTHER GOVERNMENT

## 2025-08-21 DIAGNOSIS — K21.9 GASTROESOPHAGEAL REFLUX DISEASE WITHOUT ESOPHAGITIS: ICD-10-CM

## 2025-08-21 DIAGNOSIS — R91.8 LUNG NODULES: ICD-10-CM

## 2025-08-21 PROCEDURE — 71260 CT THORAX DX C+: CPT

## 2025-08-21 PROCEDURE — 25510000001 IOPAMIDOL PER 1 ML: Performed by: RADIOLOGY

## 2025-08-21 RX ORDER — IOPAMIDOL 755 MG/ML
100 INJECTION, SOLUTION INTRAVASCULAR
Status: COMPLETED | OUTPATIENT
Start: 2025-08-21 | End: 2025-08-21

## 2025-08-21 RX ADMIN — IOPAMIDOL 100 ML: 755 INJECTION, SOLUTION INTRAVENOUS at 09:26

## (undated) DEVICE — SOLIDIFIER LIQLOC PLS 1500CC BT

## (undated) DEVICE — CONN JET HYDRA H20 AUXILIARY DISP

## (undated) DEVICE — SOL IRRG H2O PL/BG 1000ML STRL

## (undated) DEVICE — Device: Brand: DEFENDO AIR/WATER/SUCTION AND BIOPSY VALVE

## (undated) DEVICE — LINER SURG CANSTR SXN S/RIGD 1500CC

## (undated) DEVICE — SINGLE-USE BIOPSY FORCEPS: Brand: RADIAL JAW 4

## (undated) DEVICE — Device

## (undated) DEVICE — BLCK/BITE BLOX WO/DENTL/RIM W/STRAP 54F